# Patient Record
Sex: MALE | Race: WHITE | Employment: UNEMPLOYED | URBAN - METROPOLITAN AREA
[De-identification: names, ages, dates, MRNs, and addresses within clinical notes are randomized per-mention and may not be internally consistent; named-entity substitution may affect disease eponyms.]

---
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
## 2021-01-21 ENCOUNTER — OFFICE VISIT (OUTPATIENT)
Dept: PEDIATRIC ENDOCRINOLOGY | Age: 21
End: 2021-01-21
Payer: COMMERCIAL

## 2021-01-21 VITALS
HEART RATE: 112 BPM | TEMPERATURE: 97 F | DIASTOLIC BLOOD PRESSURE: 79 MMHG | SYSTOLIC BLOOD PRESSURE: 139 MMHG | WEIGHT: 127.6 LBS | BODY MASS INDEX: 18.9 KG/M2 | OXYGEN SATURATION: 98 % | HEIGHT: 69 IN

## 2021-01-21 DIAGNOSIS — E10.9 TYPE 1 DIABETES MELLITUS WITHOUT COMPLICATION (HCC): Primary | ICD-10-CM

## 2021-01-21 LAB — HBA1C MFR BLD HPLC: 9.6 %

## 2021-01-21 PROCEDURE — 99215 OFFICE O/P EST HI 40 MIN: CPT | Performed by: PEDIATRICS

## 2021-01-21 PROCEDURE — 83036 HEMOGLOBIN GLYCOSYLATED A1C: CPT | Performed by: PEDIATRICS

## 2021-01-21 PROCEDURE — 95251 CONT GLUC MNTR ANALYSIS I&R: CPT | Performed by: PEDIATRICS

## 2021-01-21 RX ORDER — GLUCAGON 3 MG/1
POWDER NASAL
Qty: 2 EACH | Refills: 0 | Status: SHIPPED | OUTPATIENT
Start: 2021-01-21 | End: 2022-10-21 | Stop reason: SDUPTHER

## 2021-01-21 NOTE — PROGRESS NOTES
[address] [facility].  [address] [address]  [phone]        Cc: Type 1 diabetes          On insulin pump: Tandem basal IQ          Fluctuation of blood sugars          Low blood sugars: Occasional          Other: Dexcom G6    [facility]:  Jazlyn Murray is a 24 y.o.  male who presents for follow up evaluation of Type 1 diabetes mellitus. His clinical course has been stable. Insulin dosage review with Todd's caregiver suggested compliance all of the time. Associated symptoms of hyperglycemia have included : none . Associated symptoms of hypoglycemia have included: jitteriness and sweating. Treatment of low blood sugar: appropriate. He is currently taking:     Humalog through : insulin pump: Tandem basal IQ was reviewed:   Change of insulin insertion sites: every 3 days. Any problems with insertion sites: none  Compliance with blood gucose monitoring: good . The patient  does perform independently. Exercise: intermittently Meal planning: He is using avoidance of concentrated sweets, carbohydrate counting. Bula Dayhoff He does low-carb diet and usually meals are between 15 to 35 g per meal and snacks are between 10 to 15 g which totals to about an average of 100 g of carbohydrate per day. He does not bolus insulin for all the meals and snacks. blood glucose times and ranges: See scanned log . MedicAlert Identification Noted? no     Past Medical History:   Diagnosis Date    Diabetes ([address]     type 1    Seizures (HCC)        Past Surgical History:   Procedure Laterality Date    HX ORTHOPAEDIC      club foot RIGHT       Family History   Problem Relation Age of Onset    Diabetes Neg Hx     Thyroid Disease Neg Hx        Current Outpatient Medications   Medication Sig Dispense Refill    glucagon (Baqsimi) 3 mg/actuation nasal spray Spray one device in one nostril for severe hypoglycemia or unconsciousness. Please label seperately.  Disp 2 1- home 1 school 2 Each 0    insulin lispro (HUMALOG) 100 unit/mL injection Inject up to 100 units daily via insulin pump. 30 mL 1    glucose blood VI test strips (CONTOUR NEXT TEST STRIPS) strip TEST UP TO 6 TIMES DAILY OR AS DIRECTED BY PHYSICIAN 600 Strip 3    insulin glargine (BASAGLAR KWIKPEN U-100 INSULIN) 100 unit/mL (3 mL) inpn Inject 30 units daily for pump failure 10 mL 3    glucagon (GLUCAGON EMERGENCY KIT, HUMAN,) 1 mg injection Inject 1 ml into thigh muscle for severe hypogylcemia and semi unconsciousness. Disp 2- 1 home, 1 school 2 mL 0    Acetone, Urine, Test (KETOSTIX) strip Check urine ketones if blood sugar >350 or illness. Disp 2- 1 home,  1-school 100 Strip 4    lancets misc Check blood sugar up to 6x daily 200 Each 4     No Known Allergies    Social History     Socioeconomic History    Marital status: SINGLE     Spouse name: Not on file    Number of children: Not on file    Years of education: Not on file    Highest education level: Not on file   Occupational History    Not on file   Social Needs    Financial resource strain: Not on file    Food insecurity     Worry: Not on file     Inability: Not on file    Transportation needs     Medical: Not on file     Non-medical: Not on file   Tobacco Use    Smoking status: Passive Smoke Exposure - Never Smoker    Smokeless tobacco: Never Used   Substance and Sexual Activity    Alcohol use:  Yes    Drug use: No    Sexual activity: Never   Lifestyle    Physical activity     Days per week: Not on file     Minutes per session: Not on file    Stress: Not on file   Relationships    Social connections     Talks on phone: Not on file     Gets together: Not on file     Attends [employer] service: Not on file     Active member of club or organization: Not on file     Attends meetings of clubs or organizations: Not on file     Relationship status: Not on file    Intimate partner violence     Fear of current or ex partner: Not on file     Emotionally abused: Not on file     Physically abused: Not on file     Forced sexual activity: Not on file   Other Topics Concern    Not on file   Social History Narrative    Not on file       Review of Systems  Constitutional: good energy, ENT: normal hearing, no sorethroat   Eye: normal vision, denied double vision, photophobia, blurred vision  Respiratory system: no wheezing, no respiratory discomfort  CVS: no palpitations, no pedal edema, GI: normal bowel movements, no abdominal pain  Allergy: no skin rash or angioedema, Neurological: no headache, no focal weakness, burning sensation of feet: no, Behavioural: behavior: normal, Skin: no rash or itching, injection sites: Had issue with Dexcom  and have reported it to the company. Objective:     Visit Vitals  BP (!) 150/83   Pulse (!) 112   Temp 97 °F (36.1 °C) (Temporal)   Ht 5' 8.9\" (1.75 m)   Wt 127 lb 9.6 oz (57.9 kg)   SpO2 98%   BMI 18.90 kg/m²       Wt Readings from Last 3 Encounters:   01/21/21 127 lb 9.6 oz (57.9 kg) (9 %, Z= -1.34)*   10/19/20 125 lb 6.4 oz (56.9 kg) (7 %, Z= -1.45)*   10/14/20 122 lb 2.2 oz (55.4 kg) (5 %, Z= -1.65)*     * Growth percentiles are based on CDC (Boys, 2-20 Years) data. Ht Readings from Last 3 Encounters:   01/21/21 5' 8.9\" (1.75 m) (40 %, Z= -0.26)*   10/19/20 5' 8.9\" (1.75 m) (40 %, Z= -0.25)*   01/21/20 5' 8.11\" (1.73 m) (31 %, Z= -0.50)*     * Growth percentiles are based on CDC (Boys, 2-20 Years) data. Body mass index is 18.9 kg/m². 4 %ile (Z= -1.75) based on CDC (Boys, 2-20 Years) BMI-for-age based on BMI available as of 1/21/2021.   9 %ile (Z= -1.34) based on CDC (Boys, 2-20 Years) weight-for-age data using vitals from 1/21/2021.   40 %ile (Z= -0.26) based on CDC (Boys, 2-20 Years) Stature-for-age data based on Stature recorded on 1/21/2021.      General:  alert, cooperative, no distress, appears stated age   Oropharynx: normal    Eyes:  {normal    Ears:  {normal   Neck: {supple, no thyromegaly       Lung: No resp distress   Heart:   Pulse equal and normal   Abdomen: {nondistended   Extremities: extremities normal, atraumatic, no cyanosis or edema   Skin: Injection sites: normal   Pulses: 2+ and symmetric   Neuro: normal without focal findings  mental status, speech normal, alert and oriented x iii  MELISSA  reflexes normal and symmetric             Lab Review  Lab Results   Component Value Date/Time    Hemoglobin A1c 10.4 (H) 10/14/2020 03:57 PM    Hemoglobin A1c 9.7 (H) 10/07/2018 07:41 PM    Hemoglobin A1c 8.5 (H) 02/24/2018 12:18 PM    Hemoglobin A1c (POC) 9.6 01/21/2021 11:15 AM    Hemoglobin A1c (POC) 10.2 10/19/2020 01:24 PM    Hemoglobin A1c (POC) 10.3 01/21/2020 11:07 AM      Lab Results   Component Value Date/Time    Hemoglobin A1c 10.4 (H) 10/14/2020 03:57 PM      Lab Results   Component Value Date/Time    Glucose 426 (H) 10/14/2020 03:57 PM        Lab Results   Component Value Date/Time    TSH 3.590 01/10/2019 12:29 PM     Lab Results   Component Value Date/Time    Cholesterol, total 124 01/10/2019 12:29 PM    HDL Cholesterol 56 01/10/2019 12:29 PM    LDL, calculated 60 01/10/2019 12:29 PM    VLDL, calculated 8 01/10/2019 12:29 PM    Triglyceride 42 01/10/2019 12:29 PM   Reviewed more than 72 hours of data of CGMS    Blood sugar trends noted. Fluctuation in blood sugars: yes. Overnight blood sugar: 90 mg/dl to 190 mg/dl. Blood sugar during day time: trends: higher post meals,  Low blood sugars: occasional    Insulin adjustment was made using these data and noted in assessment and plan section. Assessment:   Diabetes Mellitus type I, under poor control. He is not bolusing insulin for all the meals and snacks. Reviewed the use of control IQ tandem and provided information again for him to contact after finishing the course so that he can switch from the present basal IQ program.  Hypoglycemia: occasional  Blood sugar trends: reviewed  Insulin pump Insertion sites: normal  Screening:  A.  Blood pressure: reviewed, he was tense and a repeat blood pressure was 139/79 mm of Hg  B. Lipid profile: Cholesterol: ordered  C. Urine microalbumin: ordered  D. Celiac screen: ordered  E. Eye exam: need to schedule for this year  Plan:   Treatment changes  Basal rates ( time: units/hour): Humalog through insulin pump:   12 midnight: 0.9,  8 am: 1.2, 10 pm  : 0.85. Total basal insulin per day: 25 units/day. Target blood sugar: 110 mg/dl, Carbohydrate correction breakfast: 1: 8, lunch: 1: 8, dinner:1:8.    Insulin sensitivity factor/ glucose correction factor: breakfast: 1: 50 Lunch: 1: 50, dinner: 1:50. Lantus dose for basal in case of pump failure: 25 units. Insulin dose reviewed and confirmed with the caregiver. Time spent counseling patient 25 minutes  2. Education:  interpretation of lab results, blood sugar goals, complications of diabetes mellitus, hypoglycemia prevention and treatment, exercise, insulin adjustments, illness management, SMBG skills, nutrition, site rotation, use of insulin pen, carbohydrate counting, self-injection of insulin, use of sliding scale/correction formula and use of insulin: carb ratio  Importance of parental supervision stressed. Check urine ketones for:   Blood sugar levels above 350 mg/dl   Nausea and vomiting   Other illnesses, including fever, diarrhea, common cold. If ketones are negative, no change in your diabetes plan is needed  If ketones are trace or small:  Drink extra fluid (water or other calorie-free fluids)  Give insulin as you usually would base on your carbohydrate intake and your blood sugar level  Continue to check the urine ketones until they either go away, or until they increase to moderate or large  If ketones are moderate or large:  Call the diabetes team at [phone]- ask to speak to nurse and after hours/weekend/holidays ask for the pediatric endocrinologist on call. Target Hemoglobin A1C= 7.5 % reviewed. Flu vaccine recommended every year.     Patient expressed understanding. 3.  Compliance at present is estimated to be good. Efforts to improve compliance (if necessary) will be directed at increased exercise. Long term complications, Sick day management, treatment of low blood sugars, use of glucagon for hypoglycemic seizures and unconsciousness reviewed. Change pump site every 3 days and rotation of insertion sites reviewed. Hemoglobin A1C reviewed. Correlation between A1C and long term complications like neuropathy, nephropathy and retinopathy reviewed. Acute complications like diabetes ketoacidosis and dehydration and electrolyte abnormalities discussed. Annual screen labs: due on: ordered (TSH, Lipid profile, Urine microalbumin, celiac screen). Annual eye exam: stressed. Need to review the blood sugars periodically if blood sugars are out of range as discussed in the clinic consistently. School forms: none. Prescriptions:yes.  Total time with patient 40 minutes  Follow-up in 3 months

## 2021-01-21 NOTE — PROGRESS NOTES
Chief Complaint   Patient presents with    Diabetes     3 month follow up      Currently on Tandem could not upload CIQ. Is currently using basal IQ. Asked him to call [employer] today to get the CIQ portal Rx and education.      Rotating sites

## 2021-01-31 LAB
ALBUMIN/CREAT UR: 7 MG/G CREAT (ref 0–29)
CHOLEST SERPL-MCNC: 127 MG/DL (ref 100–169)
CREAT UR-MCNC: 222.8 MG/DL
HDLC SERPL-MCNC: 45 MG/DL
INTERPRETATION, 910389: NORMAL
LDLC SERPL CALC-MCNC: 62 MG/DL (ref 0–109)
MICROALBUMIN UR-MCNC: 16.4 UG/ML
TRIGL SERPL-MCNC: 110 MG/DL (ref 0–89)
TSH SERPL DL<=0.005 MIU/L-ACNC: 2.7 UIU/ML (ref 0.45–4.5)
TTG IGA SER-ACNC: <2 U/ML (ref 0–3)
VLDLC SERPL CALC-MCNC: 20 MG/DL (ref 5–40)

## 2021-03-20 DIAGNOSIS — E10.9 TYPE 1 DIABETES MELLITUS WITHOUT COMPLICATION (HCC): ICD-10-CM

## 2021-03-22 RX ORDER — INSULIN LISPRO 100 [IU]/ML
INJECTION, SOLUTION INTRAVENOUS; SUBCUTANEOUS
Qty: 90 ML | Refills: 1 | Status: SHIPPED | OUTPATIENT
Start: 2021-03-22 | End: 2021-09-27

## 2021-04-26 ENCOUNTER — OFFICE VISIT (OUTPATIENT)
Dept: PEDIATRIC ENDOCRINOLOGY | Age: 21
End: 2021-04-26
Payer: COMMERCIAL

## 2021-04-26 VITALS
WEIGHT: 128.4 LBS | TEMPERATURE: 96.8 F | BODY MASS INDEX: 19.02 KG/M2 | DIASTOLIC BLOOD PRESSURE: 87 MMHG | HEIGHT: 69 IN | HEART RATE: 79 BPM | OXYGEN SATURATION: 100 % | RESPIRATION RATE: 19 BRPM | SYSTOLIC BLOOD PRESSURE: 131 MMHG

## 2021-04-26 DIAGNOSIS — E10.65 UNCONTROLLED TYPE 1 DIABETES MELLITUS WITH HYPERGLYCEMIA (HCC): Primary | ICD-10-CM

## 2021-04-26 LAB — HBA1C MFR BLD HPLC: 9.4 %

## 2021-04-26 PROCEDURE — 99215 OFFICE O/P EST HI 40 MIN: CPT | Performed by: PEDIATRICS

## 2021-04-26 PROCEDURE — 83036 HEMOGLOBIN GLYCOSYLATED A1C: CPT | Performed by: PEDIATRICS

## 2021-04-26 PROCEDURE — 95251 CONT GLUC MNTR ANALYSIS I&R: CPT | Performed by: PEDIATRICS

## 2021-04-26 RX ORDER — BLOOD-GLUCOSE METER
EACH MISCELLANEOUS
Qty: 2 EACH | Refills: 0 | Status: SHIPPED | OUTPATIENT
Start: 2021-04-26

## 2021-04-26 RX ORDER — BLOOD SUGAR DIAGNOSTIC
STRIP MISCELLANEOUS
Qty: 200 STRIP | Refills: 4 | Status: SHIPPED | OUTPATIENT
Start: 2021-04-26

## 2021-04-26 RX ORDER — BLOOD-GLUCOSE METER
EACH MISCELLANEOUS
Qty: 2 EACH | Refills: 0 | Status: CANCELLED | OUTPATIENT
Start: 2021-04-26

## 2021-04-26 RX ORDER — BLOOD SUGAR DIAGNOSTIC
STRIP MISCELLANEOUS
Qty: 200 STRIP | Refills: 4 | Status: CANCELLED | OUTPATIENT
Start: 2021-04-26

## 2021-04-26 NOTE — PROGRESS NOTES
118 [facility].  [address] [place], [place] [id number] [phone]        Cc: Type 1 diabetes          On insulin pump: Tandem basal IQ          Fluctuation of blood sugars          Low blood sugars: occasional          Other: CGM S    [facility]:  Ryan Walter is a 22 y.o.  male who presents for follow up evaluation of Type 1 diabetes mellitus. Checking 4-6 blood sugars per day. His clinical course has been stable. Insulin dosage review with Todd's caregiver suggested compliance most of the time. Associated symptoms of hyperglycemia have included : none . Associated symptoms of hypoglycemia have included: jitteriness, sweating and hunger. Treatment of low blood sugar: appropriate. He is currently taking:     Humalog through : insulin pump: Tandem insulin pump was reviewed: Change of insulin insertion sites: every 3 days. Any problems with insertion sites: none. Compliance with blood gucose monitoring: good . The patient  does perform independently. Exercise: intermittently Meal planning: He is using avoidance of concentrated sweets, carbohydrate counting. . Blood glucose times and ranges: See scanned log . MedicAlert Identification Noted? no     Past Medical History:   Diagnosis Date    Diabetes ([address]     type 1    Seizures (HCC)        Past Surgical History:   Procedure Laterality Date    HX ORTHOPAEDIC      club foot RIGHT       Family History   Problem Relation Age of Onset    Diabetes Neg Hx     Thyroid Disease Neg Hx        Current Outpatient Medications   Medication Sig Dispense Refill    insulin lispro (HumaLOG U-100 Insulin) 100 unit/mL injection INJECT UP  UNITS DAILY VIA INSULIN PUMP. 90 mL 1    glucagon (Baqsimi) 3 mg/actuation nasal spray Spray one device in one nostril for severe hypoglycemia or unconsciousness. Please label seperately.  Disp 2 1- home 1 school 2 Each 0    glucose blood VI test strips (CONTOUR NEXT TEST STRIPS) strip TEST UP TO 6 TIMES DAILY OR AS DIRECTED BY PHYSICIAN 600 Strip 3    insulin glargine (BASAGLAR KWIKPEN U-100 INSULIN) 100 unit/mL (3 mL) inpn Inject 30 units daily for pump failure 10 mL 3    glucagon (GLUCAGON EMERGENCY KIT, HUMAN,) 1 mg injection Inject 1 ml into thigh muscle for severe hypogylcemia and semi unconsciousness. Disp 2- 1 home, 1 school 2 mL 0    Acetone, Urine, Test (KETOSTIX) strip Check urine ketones if blood sugar >350 or illness. Disp 2- 1 home,  1-school 100 Strip 4    lancets misc Check blood sugar up to 6x daily 200 Each 4     No Known Allergies    Social History     Socioeconomic History    Marital status: SINGLE     Spouse name: Not on file    Number of children: Not on file    Years of education: Not on file    Highest education level: Not on file   Occupational History    Not on file   Social Needs    Financial resource strain: Not on file    Food insecurity     Worry: Not on file     Inability: Not on file    Transportation needs     Medical: Not on file     Non-medical: Not on file   Tobacco Use    Smoking status: Passive Smoke Exposure - Never Smoker    Smokeless tobacco: Never Used   Substance and Sexual Activity    Alcohol use:  Yes    Drug use: No    Sexual activity: Never   Lifestyle    Physical activity     Days per week: Not on file     Minutes per session: Not on file    Stress: Not on file   Relationships    Social connections     Talks on phone: Not on file     Gets together: Not on file     Attends Confucianism service: Not on file     Active member of club or organization: Not on file     Attends meetings of clubs or organizations: Not on file     Relationship status: Not on file    Intimate partner violence     Fear of current or ex partner: Not on file     Emotionally abused: Not on file     Physically abused: Not on file     Forced sexual activity: Not on file   Other Topics Concern    Not on file   Social History Narrative    Not on file   Review of Systems  Constitutional: good energy, ENT: normal hearing, no sorethroat   Eye: normal vision, denied double vision, photophobia, blurred vision  Respiratory system: no wheezing, no respiratory discomfort  CVS: no palpitations, no pedal edema, GI: normal bowel movements, no abdominal pain  Allergy: no skin rash or angioedema, Neurological: no headache, no focal weakness, burning sensation of feet: no, Behavioural: behavior: normal, mood; normal, Skin: no rash or itching, injection sites: normal.   Objective:     Visit Vitals  /87 (BP 1 Location: Left upper arm, BP Patient Position: Sitting, BP Cuff Size: Adult)   Pulse 79   Temp 96.8 °F (36 °C) (Temporal)   Resp 19   Ht 5' 9.13\" (1.756 m)   Wt 128 lb 6.4 oz (58.2 kg)   SpO2 100%   BMI 18.89 kg/m²       Wt Readings from Last 3 Encounters:   04/26/21 128 lb 6.4 oz (58.2 kg)   01/21/21 127 lb 9.6 oz (57.9 kg) (9 %, Z= -1.34)*   10/19/20 125 lb 6.4 oz (56.9 kg) (7 %, Z= -1.45)*     * Growth percentiles are based on CDC (Boys, 2-20 Years) data. Ht Readings from Last 3 Encounters:   04/26/21 5' 9.13\" (1.756 m)   01/21/21 5' 8.9\" (1.75 m) (40 %, Z= -0.26)*   10/19/20 5' 8.9\" (1.75 m) (40 %, Z= -0.25)*     * Growth percentiles are based on CDC (Boys, 2-20 Years) data. Body mass index is 18.89 kg/m². Facility age limit for growth percentiles is 20 years. Facility age limit for growth percentiles is 20 years. Facility age limit for growth percentiles is 20 years.      General:  alert, cooperative, no distress, appears stated age   Oropharynx: normal    Eyes:  {normal    Ears:  {normal   Neck: {supple, no thyromegaly       Lung: No resp distress   Heart:  Pulse equal and normal   Abdomen: {nondistended   Extremities: extremities normal, atraumatic, no cyanosis or edema   Skin: Injection sites: normal   Pulses: 2+ and symmetric   Neuro: normal without focal findings  mental status, speech normal, alert and oriented x iii  MELISSA  reflexes normal and symmetric             Lab Review  Lab Results   Component Value Date/Time    Hemoglobin A1c 10.4 (H) 10/14/2020 03:57 PM    Hemoglobin A1c 9.7 (H) 10/07/2018 07:41 PM    Hemoglobin A1c 8.5 (H) 02/24/2018 12:18 PM    Hemoglobin A1c (POC) 9.4 04/26/2021 11:20 AM    Hemoglobin A1c (POC) 9.6 01/21/2021 11:15 AM    Hemoglobin A1c (POC) 10.2 10/19/2020 01:24 PM      Lab Results   Component Value Date/Time    Hemoglobin A1c 10.4 (H) 10/14/2020 03:57 PM      Lab Results   Component Value Date/Time    Glucose 426 (H) 10/14/2020 03:57 PM        Lab Results   Component Value Date/Time    TSH 2.700 01/29/2021 08:57 AM     Lab Results   Component Value Date/Time    Cholesterol, total 127 01/29/2021 08:57 AM    HDL Cholesterol 45 01/29/2021 08:57 AM    LDL, calculated 62 01/29/2021 08:57 AM    LDL, calculated 60 01/10/2019 12:29 PM    VLDL, calculated 20 01/29/2021 08:57 AM    VLDL, calculated 8 01/10/2019 12:29 PM    Triglyceride 110 (H) 01/29/2021 08:57 AM   Reviewed more than 72 hours of data of CGMS    Blood sugar trends noted. Fluctuation in blood sugars: yes. Overnight blood sugar: 90 mg/dl to 220 mg/dl. Blood sugar during day time: trends: higher post meals,  Low blood sugars: occasional    Insulin adjustment was made using these data and noted in assessment and plan section. Assessment:   Diabetes Mellitus type I, under poor control. Hypoglycemia: occasional  Blood sugar trends: reveiwed  Insulin pump Insertion sites: normal  None provided information to start the control IQ and gave information again for the process. Plan:   Treatment changes  Basal rates ( time: units/hour): Humalog through insulin pump:   12 midnight: 0.95, 8 am: 1.25, 10 PM: 0.9. Total basal insulin per day: 26.9 units/day. Target blood sugar: 110 mg/dl, Carbohydrate correction breakfast: 1: 8, lunch: 1: 8, dinner:1:8.    Insulin sensitivity factor/ glucose correction factor: breakfast: 1: 50 Lunch: 1: 50, dinner: 1:50.  Lantus dose for basal in case of pump failure: 27 units. Insulin dose reviewed and confirmed with the caregiver. Time spent counseling patient 25 minutes  2. Education:  interpretation of lab results, blood sugar goals, complications of diabetes mellitus, hypoglycemia prevention and treatment, exercise, insulin adjustments, illness management, SMBG skills, nutrition, referred to Diabetes Educator, site rotation, use of insulin pen, carbohydrate counting, self-injection of insulin, use of sliding scale/correction formula and use of insulin: carb ratio  Importance of parental supervision stressed. Check urine ketones for:   Blood sugar levels above 350 mg/dl   Nausea and vomiting   Other illnesses, including fever, diarrhea, common cold. If ketones are negative, no change in your diabetes plan is needed  If ketones are trace or small:  Drink extra fluid (water or other calorie-free fluids)  Give insulin as you usually would base on your carbohydrate intake and your blood sugar level  Continue to check the urine ketones until they either go away, or until they increase to moderate or large  If ketones are moderate or large:  Call the diabetes team at [phone] ask to speak to nurse and after hours/weekend/holidays ask for the pediatric endocrinologist on call. Target Hemoglobin A1C= 7.5 % reviewed. Flu vaccine recommended every year. Patient expressed understanding. 3.  Compliance at present is estimated to be good. Efforts to improve compliance (if necessary) will be directed at increased exercise. Long term complications, Sick day management, treatment of low blood sugars, use of glucagon for hypoglycemic seizures and unconsciousness reviewed. Change pump site every 3 days and rotation of insertion sites reviewed. Hemoglobin A1C reviewed. Correlation between A1C and long term complications like neuropathy, nephropathy and retinopathy reviewed.  Acute complications like diabetes ketoacidosis and dehydration and electrolyte abnormalities discussed. Annual screen labs: due on: reviewed (TSH, Lipid profile, Urine microalbumin, celiac screen). Annual eye exam: stressed. Need to review the blood sugars periodically if blood sugars are out of range as discussed in the clinic consistently. School forms: none. Prescriptions:none. Total time with patient 40 minutes  Follow up in 3 months.

## 2021-09-24 ENCOUNTER — OFFICE VISIT (OUTPATIENT)
Dept: PEDIATRIC ENDOCRINOLOGY | Age: 21
End: 2021-09-24
Payer: COMMERCIAL

## 2021-09-24 VITALS
HEART RATE: 107 BPM | TEMPERATURE: 99.8 F | DIASTOLIC BLOOD PRESSURE: 71 MMHG | BODY MASS INDEX: 18.81 KG/M2 | OXYGEN SATURATION: 100 % | HEIGHT: 68 IN | RESPIRATION RATE: 19 BRPM | WEIGHT: 124.13 LBS | SYSTOLIC BLOOD PRESSURE: 126 MMHG

## 2021-09-24 DIAGNOSIS — E10.9 TYPE 1 DIABETES MELLITUS WITHOUT COMPLICATION (HCC): Primary | ICD-10-CM

## 2021-09-24 LAB — HBA1C MFR BLD HPLC: 8.8 %

## 2021-09-24 PROCEDURE — 83036 HEMOGLOBIN GLYCOSYLATED A1C: CPT | Performed by: PEDIATRICS

## 2021-09-24 PROCEDURE — 3052F HG A1C>EQUAL 8.0%<EQUAL 9.0%: CPT | Performed by: PEDIATRICS

## 2021-09-24 PROCEDURE — 99215 OFFICE O/P EST HI 40 MIN: CPT | Performed by: PEDIATRICS

## 2021-09-24 PROCEDURE — 95251 CONT GLUC MNTR ANALYSIS I&R: CPT | Performed by: PEDIATRICS

## 2021-09-24 RX ORDER — BLOOD SUGAR DIAGNOSTIC
STRIP MISCELLANEOUS
Qty: 600 STRIP | Refills: 3 | Status: SHIPPED | OUTPATIENT
Start: 2021-09-24

## 2021-09-24 NOTE — PROGRESS NOTES
[facility].  [address] [address]  [phone]      Cc: Type 1 diabetes          On insulin pump: Tandem basal IQ          Fluctuation of blood sugars          Low blood sugars: occasional          Other: CGM S     [facility]:  Leslie Smith is a 22 years and 10 months old male who presents for follow up evaluation of Type 1 diabetes mellitus. Checking 4-6 blood sugars per day. His clinical course has been stable. Insulin dosage review with Todd's caregiver suggested compliance most of the time. Associated symptoms of hyperglycemia have included : none . Associated symptoms of hypoglycemia have included: jitteriness, sweating and hunger. Treatment of low blood sugar: appropriate.     He is currently taking: Humalog through : insulin pump: Tandem insulin pump was reviewed:  Patient had not changed to control IQ in spite of giving all the information over the last 2 visits . change of insulin insertion sites: every 3 days. Any problems with insertion sites: none. Compliance with blood gucose monitoring: good . The patient  does perform independently. Exercise: intermittently Meal planning: He is using avoidance of concentrated sweets, carbohydrate counting. . Blood glucose times and ranges: See scanned log . MedicAlert Identification Noted? no     Past Medical History:   Diagnosis Date    Diabetes ([address]     type 1    Seizures (HCC)        Past Surgical History:   Procedure Laterality Date    HX ORTHOPAEDIC      club foot RIGHT       Family History   Problem Relation Age of Onset    Diabetes Neg Hx     Thyroid Disease Neg Hx        Current Outpatient Medications   Medication Sig Dispense Refill    Blood-Glucose Meter (Contour Next One Meter) misc Use to test blood sugar.  Disp 2 1 home 1 school 2 Each 0    glucose blood VI test strips (Contour Next Test Strips) strip Test blood sugar up to 6 times daily 200 Strip 4    insulin lispro (HumaLOG U-100 Insulin) 100 unit/mL injection INJECT UP  UNITS DAILY VIA INSULIN PUMP. 90 mL 1    glucagon (Baqsimi) 3 mg/actuation nasal spray Spray one device in one nostril for severe hypoglycemia or unconsciousness. Please label seperately. Disp 2 1- home 1 school 2 Each 0    glucose blood VI test strips (CONTOUR NEXT TEST STRIPS) strip TEST UP TO 6 TIMES DAILY OR AS DIRECTED BY PHYSICIAN 600 Strip 3    insulin glargine (BASAGLAR KWIKPEN U-100 INSULIN) 100 unit/mL (3 mL) inpn Inject 30 units daily for pump failure 10 mL 3    Acetone, Urine, Test (KETOSTIX) strip Check urine ketones if blood sugar >350 or illness. Disp 2- 1 home,  1-school 100 Strip 4    lancets misc Check blood sugar up to 6x daily 200 Each 4    glucagon (GLUCAGON EMERGENCY KIT, HUMAN,) 1 mg injection Inject 1 ml into thigh muscle for severe hypogylcemia and semi unconsciousness. Disp 2- 1 home, 1 school (Patient not taking: Reported on 9/24/2021) 2 mL 0     No Known Allergies    Social History     Socioeconomic History    Marital status: SINGLE     Spouse name: Not on file    Number of children: Not on file    Years of education: Not on file    Highest education level: Not on file   Occupational History    Not on file   Tobacco Use    Smoking status: Passive Smoke Exposure - Never Smoker    Smokeless tobacco: Never Used   Substance and Sexual Activity    Alcohol use: Yes    Drug use: No    Sexual activity: Never   Other Topics Concern    Not on file   Social History Narrative    Not on file     Social Determinants of Health     Financial Resource Strain:     Difficulty of Paying Living Expenses:    Food Insecurity:     Worried About Running Out of Food in the Last Year:     [address] in the Last Year:    Transportation Needs:     Lack of Transportation (Medical):      Lack of Transportation (Non-Medical):    Physical Activity:     Days of Exercise per Week:     Minutes of Exercise per Session:    Stress:     Feeling of Stress : Social Connections:     Frequency of Communication with Friends and Family:     Frequency of Social Gatherings with Friends and Family:     Attends Mandaeism Services:     Active Member of Clubs or Organizations:     Attends Club or Organization Meetings:     Marital Status:    Intimate Partner Violence:     Fear of Current or Ex-Partner:     Emotionally Abused:     Physically Abused:     Sexually Abused:        Review of Systems  Constitutional: good energy, ENT: normal hearing, no sorethroat   Eye: normal vision, denied double vision, photophobia, blurred vision  Respiratory system: no wheezing, no respiratory discomfort  CVS: no palpitations, no pedal edema, GI: normal bowel movements, no abdominal pain  Allergy: no skin rash or angioedema, Neurological: no headache, no focal weakness, burning sensation of feet: no, Behavioural: behavior: normal, mood; normal, Skin: no rash or itching, injection sites: normal.   Objective:     Visit Vitals  /71   Pulse (!) 107   Temp 99.8 °F (37.7 °C) (Temporal)   Resp 19   Ht 5' 8.03\" (1.728 m)   Wt 124 lb 2 oz (56.3 kg)   SpO2 100%   BMI 18.86 kg/m²     Took decongestant this AM and heart rate is increased    Wt Readings from Last 3 Encounters:   09/24/21 124 lb 2 oz (56.3 kg)   04/26/21 128 lb 6.4 oz (58.2 kg)   01/21/21 127 lb 9.6 oz (57.9 kg) (9 %, Z= -1.34)*     * Growth percentiles are based on CDC (Boys, 2-20 Years) data. Ht Readings from Last 3 Encounters:   09/24/21 5' 8.03\" (1.728 m)   04/26/21 5' 9.13\" (1.756 m)   01/21/21 5' 8.9\" (1.75 m) (40 %, Z= -0.26)*     * Growth percentiles are based on CDC (Boys, 2-20 Years) data. Body mass index is 18.86 kg/m². Facility age limit for growth percentiles is 20 years. Facility age limit for growth percentiles is 20 years. Facility age limit for growth percentiles is 20 years.      General:  alert, cooperative, no distress, appears stated age   Oropharynx: normal    Eyes:  {normal    Ears: {normal   Neck: {supple, no thyromegaly       Lung: No resp distress   Heart:  Pulse equal and normal   Abdomen: {nondistended   Extremities: extremities normal, atraumatic, no cyanosis or edema   Skin: Injection sites: normal   Pulses: 2+ and symmetric   Neuro: normal without focal findings  mental status, speech normal, alert and oriented x iii  MELISSA  reflexes normal and symmetric             Lab Review  Lab Results   Component Value Date/Time    Hemoglobin A1c 10.4 (H) 10/14/2020 03:57 PM    Hemoglobin A1c 9.7 (H) 10/07/2018 07:41 PM    Hemoglobin A1c 8.5 (H) 02/24/2018 12:18 PM    Hemoglobin A1c (POC) 9.4 04/26/2021 11:20 AM    Hemoglobin A1c (POC) 9.6 01/21/2021 11:15 AM    Hemoglobin A1c (POC) 10.2 10/19/2020 01:24 PM      Lab Results   Component Value Date/Time    Hemoglobin A1c 10.4 (H) 10/14/2020 03:57 PM      Lab Results   Component Value Date/Time    Glucose 426 (H) 10/14/2020 03:57 PM        Lab Results   Component Value Date/Time    TSH 2.700 01/29/2021 08:57 AM     Lab Results   Component Value Date/Time    Cholesterol, total 127 01/29/2021 08:57 AM    HDL Cholesterol 45 01/29/2021 08:57 AM    LDL, calculated 62 01/29/2021 08:57 AM    LDL, calculated 60 01/10/2019 12:29 PM    VLDL, calculated 20 01/29/2021 08:57 AM    VLDL, calculated 8 01/10/2019 12:29 PM    Triglyceride 110 (H) 01/29/2021 08:57 AM   Reviewed more than 72 hours of data of CGMS    Blood sugar trends noted. Fluctuation in blood sugars: yes. Overnight blood sugar: 90 mg/dl to 220 mg/dl. Blood sugar during day time: trends: higher post meals,  Low blood sugars: occasional  Does very minimal boluses afraid of low blood sugars and tend to eat low carbs. He tends to run high blood sugars during the daytime because of the concern of hypoglycemia  Insulin adjustment was made using these data and noted in assessment and plan section. Assessment:   Diabetes Mellitus type I, under poor control.   Hypoglycemia: occasional  Blood sugar trends: reviewed  Insulin pump Insertion sites: normal    Plan:   Treatment changes  Basal rates ( time: units/hour): Humalog through insulin pump:   12 midnight: 0.95,  1.35 am: 10 pm, 0.9 Total basal insulin per day: 28 units/day. Target blood sugar: 110 mg/dl, Carbohydrate correction breakfast: 1: 8, lunch: 1: 8, dinner:1:8.    Insulin sensitivity factor/ glucose correction factor: breakfast: 1: 45 Lunch: 1: 45, dinner: 1:45. Lantus dose for basal in case of pump failure: 28 units. Insulin dose reviewed and confirmed with the caregiver. Time spent counseling patient 25 minutes  2. Education:  interpretation of lab results, blood sugar goals, complications of diabetes mellitus, hypoglycemia prevention and treatment, exercise, insulin adjustments, illness management, SMBG skills, nutrition, site rotation, use of insulin pen, carbohydrate counting and self-injection of insulin  Importance of parental supervision stressed. Check urine ketones for:   Blood sugar levels above 350 mg/dl   Nausea and vomiting   Other illnesses, including fever, diarrhea, common cold. If ketones are negative, no change in your diabetes plan is needed  If ketones are trace or small:  Drink extra fluid (water or other calorie-free fluids)  Give insulin as you usually would base on your carbohydrate intake and your blood sugar level  Continue to check the urine ketones until they either go away, or until they increase to moderate or large  If ketones are moderate or large:  Call the diabetes team at [phone] ask to speak to nurse and after hours/weekend/holidays ask for the pediatric endocrinologist on call. Target Hemoglobin A1C= 7.5 % reviewed. Flu vaccine recommended every year. Patient expressed understanding. 3.  Compliance at present is estimated to be good. Efforts to improve compliance (if necessary) will be directed at increased exercise.   Long term complications, Sick day management, treatment of low blood sugars, use of glucagon for hypoglycemic seizures and unconsciousness reviewed. Change pump site every 3 days and rotation of insertion sites reviewed. Hemoglobin A1C reviewed. Correlation between A1C and long term complications like neuropathy, nephropathy and retinopathy reviewed. Acute complications like diabetes ketoacidosis and dehydration and electrolyte abnormalities discussed. Annual screen labs: due on: none (TSH, Lipid profile, Urine microalbumin, celiac screen). Annual eye exam: stressed. Need to review the blood sugars periodically if blood sugars are out of range as discussed in the clinic consistently. School forms: none. Prescriptions:yes. Total time with patient 40 minutes  Encourage patient to apply for control IQ and provided information and steps involved again. Reviewed the treatment of low blood sugar and the concerns he has with hypoglycemia and control IQ should be helping him too.

## 2021-09-27 DIAGNOSIS — E10.9 TYPE 1 DIABETES MELLITUS WITHOUT COMPLICATION (HCC): ICD-10-CM

## 2021-09-27 RX ORDER — INSULIN LISPRO 100 [IU]/ML
INJECTION, SOLUTION INTRAVENOUS; SUBCUTANEOUS
Qty: 90 ML | Refills: 1 | Status: SHIPPED | OUTPATIENT
Start: 2021-09-27 | End: 2022-05-26

## 2022-01-10 ENCOUNTER — PATIENT MESSAGE (OUTPATIENT)
Dept: PEDIATRIC ENDOCRINOLOGY | Age: 22
End: 2022-01-10

## 2022-01-10 ENCOUNTER — OFFICE VISIT (OUTPATIENT)
Dept: PEDIATRIC ENDOCRINOLOGY | Age: 22
End: 2022-01-10
Payer: COMMERCIAL

## 2022-01-10 VITALS
WEIGHT: 126 LBS | OXYGEN SATURATION: 99 % | SYSTOLIC BLOOD PRESSURE: 110 MMHG | HEART RATE: 88 BPM | TEMPERATURE: 97.8 F | BODY MASS INDEX: 19.1 KG/M2 | DIASTOLIC BLOOD PRESSURE: 73 MMHG | HEIGHT: 68 IN

## 2022-01-10 DIAGNOSIS — E10.9 TYPE 1 DIABETES MELLITUS WITHOUT COMPLICATION (HCC): Primary | ICD-10-CM

## 2022-01-10 LAB — HBA1C MFR BLD HPLC: 8.9 %

## 2022-01-10 PROCEDURE — 83036 HEMOGLOBIN GLYCOSYLATED A1C: CPT | Performed by: PEDIATRICS

## 2022-01-10 PROCEDURE — 3052F HG A1C>EQUAL 8.0%<EQUAL 9.0%: CPT | Performed by: PEDIATRICS

## 2022-01-10 PROCEDURE — 99215 OFFICE O/P EST HI 40 MIN: CPT | Performed by: PEDIATRICS

## 2022-01-10 NOTE — PROGRESS NOTES
Elly Head is a 22 y.o. male    Chief Complaint   Patient presents with    Follow-up     diabetes; Concerned with blister on 4th toe of left foot; has healed;        1. Have you been to the ER, urgent care clinic since your last visit? Hospitalized since your last visit? No    2. Have you seen or consulted any other health care providers outside of the [address] [place] [address] since your last visit? Include any pap smears or colon screening.  No

## 2022-01-10 NOTE — PROGRESS NOTES
[facility] Encounter:    Met with aR Schmitz, he is doing well. See below email to [employer]. He reports there is an error 11 code when trying to upload the new [facility]! Can you help me to see where we are with getting the below patient set up with control IQ. He has fear of lows and is resistance to change any dosing. He is currently using BIQ but I need to support him. Can you please let me know if we need to get Dr Dixie Real to sign the Rx for the CIQ.  We have working for a year to get him this amazing technology- I would like to see this through    Hodges Hair  Male, 22 y.o., 2000

## 2022-01-10 NOTE — PROGRESS NOTES
118 [facility].  [address] [address] [phone]        Cc: Type 1 diabetes          On insulin pump: Tandem basal IQ          Fluctuation of blood sugars          Low blood sugars: occasional          Other: CGM S          Fear of low blood sugars. [facility]:  Todd Finnegan is a 21 years and 11 months old male who presents for follow up evaluation of Type 1 diabetes mellitus. Checking 4-6 blood sugars per day. His clinical course has been stable. Insulin dosage review with Todd's caregiver suggested compliance most of the time.  Associated symptoms of hyperglycemia have included : none .  Associated symptoms of hypoglycemia have included: jitteriness, sweating and hunger. Treatment of low blood sugar: appropriate.     He is currently taking: Humalog through : insulin pump: Tandem insulin pump was reviewed:  Patient had not changed to control IQ in spite of giving all the information over the last 3 visits . change of insulin insertion sites: every 3 days. Any problems with insertion sites: none. Compliance with blood gucose monitoring: good. The patient  does perform independently. Exercise: intermittently Meal planning: He is using avoidance of concentrated sweets, carbohydrate counting. . Blood glucose times and ranges: See scanned log . MedicAlert Identification Noted? no. Past Medical History:   Diagnosis Date    Diabetes ([address]     type 1    Seizures (HCC)        Past Surgical History:   Procedure Laterality Date    HX ORTHOPAEDIC      club foot RIGHT       Family History   Problem Relation Age of Onset    Diabetes Neg Hx     Thyroid Disease Neg Hx        Current Outpatient Medications   Medication Sig Dispense Refill    insulin lispro (HumaLOG U-100 Insulin) 100 unit/mL injection INJECT UP  UNITS DAILY VIA INSULIN PUMP.  90 mL 1    glucose blood VI test strips (Contour Next Test Strips) strip TEST UP TO 6 TIMES DAILY OR AS DIRECTED BY PHYSICIAN 600 Strip 3    Blood-Glucose Meter (Contour Next One Meter) misc Use to test blood sugar. Disp 2 1 home 1 school 2 Each 0    glucose blood VI test strips (Contour Next Test Strips) strip Test blood sugar up to 6 times daily 200 Strip 4    glucagon (Baqsimi) 3 mg/actuation nasal spray Spray one device in one nostril for severe hypoglycemia or unconsciousness. Please label seperately. Disp 2 1- home 1 school 2 Each 0    insulin glargine (BASAGLAR KWIKPEN U-100 INSULIN) 100 unit/mL (3 mL) inpn Inject 30 units daily for pump failure 10 mL 3    Acetone, Urine, Test (KETOSTIX) strip Check urine ketones if blood sugar >350 or illness. Disp 2- 1 home,  1-school 100 Strip 4    lancets misc Check blood sugar up to 6x daily 200 Each 4    glucagon (GLUCAGON EMERGENCY KIT, HUMAN,) 1 mg injection Inject 1 ml into thigh muscle for severe hypogylcemia and semi unconsciousness. Disp 2- 1 home, 1 school (Patient not taking: Reported on 9/24/2021) 2 mL 0     No Known Allergies    Social History     Socioeconomic History    Marital status: SINGLE     Spouse name: Not on file    Number of children: Not on file    Years of education: Not on file    Highest education level: Not on file   Occupational History    Not on file   Tobacco Use    Smoking status: Passive Smoke Exposure - Never Smoker    Smokeless tobacco: Never Used   Substance and Sexual Activity    Alcohol use: Yes    Drug use: No    Sexual activity: Never   Other Topics Concern    Not on file   Social History Narrative    Not on file     Social Determinants of Health     Financial Resource Strain:     Difficulty of Paying Living Expenses: Not on file   Food Insecurity:     Worried About Running Out of Food in the Last Year: Not on file    Jorge of Food in the Last Year: Not on file   Transportation Needs:     Lack of Transportation (Medical): Not on file    Lack of Transportation (Non-Medical):  Not on file   Physical Activity:     Days of Exercise per Week: Not on file    Minutes of Exercise per Session: Not on file   Stress:     Feeling of Stress : Not on file   Social Connections:     Frequency of Communication with Friends and Family: Not on file    Frequency of Social Gatherings with Friends and Family: Not on file    Attends Islam Services: Not on file    Active Member of [address] [place] [address] or Organizations: Not on file    Attends Club or Organization Meetings: Not on file    Marital Status: Not on file   Intimate Partner Violence:     Fear of Current or Ex-Partner: Not on file    Emotionally Abused: Not on file    Physically Abused: Not on file    Sexually Abused: Not on file   Housing Stability:     Unable to Pay for Housing in the Last Year: Not on file    Number of [place] in the Last Year: Not on file    Unstable Housing in the Last Year: Not on file   Review of Systems  Constitutional: good energy, ENT: normal hearing, no sorethroat   Eye: normal vision, denied double vision, photophobia, blurred vision  Respiratory system: no wheezing, no respiratory discomfort  CVS: no palpitations, no pedal edema, GI: normal bowel movements, no abdominal pain  Allergy: no skin rash or angioedema, Neurological: no headache, no focal weakness, burning sensation of feet: no, Behavioural: behavior: normal, mood; normal, Skin: no rash or itching, injection sites: no.   Objective:     Visit Vitals  /73 (BP 1 Location: Left upper arm, BP Patient Position: Sitting)   Pulse 88   Temp 97.8 °F (36.6 °C) (Temporal)   Ht 5' 7.91\" (1.725 m)   Wt 126 lb (57.2 kg)   SpO2 99%   BMI 19.21 kg/m²       Wt Readings from Last 3 Encounters:   01/10/22 126 lb (57.2 kg)   09/24/21 124 lb 2 oz (56.3 kg)   04/26/21 128 lb 6.4 oz (58.2 kg)       Ht Readings from Last 3 Encounters:   01/10/22 5' 7.91\" (1.725 m)   09/24/21 5' 8.03\" (1.728 m)   04/26/21 5' 9.13\" (1.756 m)      Body mass index is 19.21 kg/m². Facility age limit for growth percentiles is 20 years.    Facility age limit for growth percentiles is 20 years. Facility age limit for growth percentiles is 20 years. General:  alert, cooperative, no distress, appears stated age   Oropharynx: normal    Eyes:  {normal    Ears:  {normal   Neck: {supple, no thyromegaly       Lung: No resp distress   Heart:  Pulse equal and normal   Abdomen: {nondistended   Extremities: extremities normal, atraumatic, no cyanosis or edema   Skin: Injection sites: normal   Pulses: 2+ and symmetric   Neuro: normal without focal findings  mental status, speech normal, alert and oriented x iii  MELISSA  reflexes normal and symmetric             Lab Review  Lab Results   Component Value Date/Time    Hemoglobin A1c 10.4 (H) 10/14/2020 03:57 PM    Hemoglobin A1c 9.7 (H) 10/07/2018 07:41 PM    Hemoglobin A1c 8.5 (H) 02/24/2018 12:18 PM    Hemoglobin A1c (POC) 8.8 09/24/2021 11:32 AM    Hemoglobin A1c (POC) 9.4 04/26/2021 11:20 AM    Hemoglobin A1c (POC) 9.6 01/21/2021 11:15 AM      Lab Results   Component Value Date/Time    Hemoglobin A1c 10.4 (H) 10/14/2020 03:57 PM      Lab Results   Component Value Date/Time    Glucose 426 (H) 10/14/2020 03:57 PM        Lab Results   Component Value Date/Time    TSH 2.700 01/29/2021 08:57 AM     Lab Results   Component Value Date/Time    Cholesterol, total 127 01/29/2021 08:57 AM    HDL Cholesterol 45 01/29/2021 08:57 AM    LDL, calculated 62 01/29/2021 08:57 AM    LDL, calculated 60 01/10/2019 12:29 PM    VLDL, calculated 20 01/29/2021 08:57 AM    VLDL, calculated 8 01/10/2019 12:29 PM    Triglyceride 110 (H) 01/29/2021 08:57 AM     Reviewed more than 72 hours of data of CGMS     Blood sugar trends noted. Fluctuation in blood sugars: yes. Overnight blood sugar: 90 mg/dl to 220 mg/dl. Blood sugar during day time: trends: higher post meals,  Low blood sugars: occasional  Does very minimal boluses afraid of low blood sugars and tend to eat low carbs.   He tends to run high blood sugars during the daytime because of the concern of hypoglycemia  Insulin adjustment was made using these data and noted in assessment and plan section.      Assessment:   Diabetes Mellitus type I, under poor control. Hypoglycemia: occasional  Blood sugar trends: reviewed  Insulin pump Insertion sites: normal   Plan:   Treatment changes  Basal rates ( time: units/hour): Humalog through insulin pump:   12 midnight: 0.95,  1.35 am: 10 pm, 0.9 Total basal insulin per day: 28 units/day.       Target blood sugar: 110 mg/dl, Carbohydrate correction breakfast: 1: 8, lunch: 1: 8, dinner:1:8.     Insulin sensitivity factor/ glucose correction factor: breakfast: 1: 45 Lunch: 1: 45, dinner: 1:45. Lantus dose for basal in case of pump failure: 28 units. Talked to our CDE to help to transition to insulin pump Control IQ  Insulin dose reviewed and confirmed with the caregiver. Time spent counseling patient 25 minutes  2. Education:  interpretation of lab results, blood sugar goals, complications of diabetes mellitus, hypoglycemia prevention and treatment, exercise, insulin adjustments, illness management, SMBG skills, nutrition, site rotation, use of insulin pen, carbohydrate counting and self-injection of insulin  Importance of parental supervision stressed. Check urine ketones for:  · Blood sugar levels above 350 mg/dl  · Nausea and vomiting  · Other illnesses, including fever, diarrhea, common cold.   If ketones are negative, no change in your diabetes plan is needed  If ketones are trace or small:  Drink extra fluid (water or other calorie-free fluids)  Give insulin as you usually would base on your carbohydrate intake and your blood sugar level  Continue to check the urine ketones until they either go away, or until they increase to moderate or large  If ketones are moderate or large:  Call the diabetes team at [phone] ask to speak to nurse and after hours/weekend/holidays ask for the pediatric endocrinologist on call.      Target Hemoglobin A1C= 7.5 % reviewed. Flu vaccine recommended every year.     Patient expressed understanding.     3. Compliance at present is estimated to be good. Efforts to improve compliance (if necessary) will be directed at increased exercise. Long term complications, Sick day management, treatment of low blood sugars, use of glucagon for hypoglycemic seizures and unconsciousness reviewed. Change pump site every 3 days and rotation of insertion sites reviewed. Hemoglobin A1C reviewed. Correlation between A1C and long term complications like neuropathy, nephropathy and retinopathy reviewed. Acute complications like diabetes ketoacidosis and dehydration and electrolyte abnormalities discussed. Annual screen labs: due on: none (TSH, Lipid profile, Urine microalbumin, celiac screen). Annual eye exam: stressed. Need to review the blood sugars periodically if blood sugars are out of range as discussed in the clinic consistently. School forms: none. Prescriptions:yes. Total time with patient 40 minutes. Follow up in 3 months. Encourage patient to apply for control IQ and provided information and steps involved again. Reviewed the treatment of low blood sugar and the concerns he has with hypoglycemia and control IQ should be helping him too.

## 2022-05-20 ENCOUNTER — TELEPHONE (OUTPATIENT)
Dept: PEDIATRIC GASTROENTEROLOGY | Age: 22
End: 2022-05-20

## 2022-05-20 NOTE — TELEPHONE ENCOUNTER
Lam Marino from [facility] called checking status for CGM order.     Please advise [phone]

## 2022-05-26 DIAGNOSIS — E10.9 TYPE 1 DIABETES MELLITUS WITHOUT COMPLICATION (HCC): ICD-10-CM

## 2022-05-26 RX ORDER — INSULIN LISPRO 100 [IU]/ML
INJECTION, SOLUTION INTRAVENOUS; SUBCUTANEOUS
Qty: 90 ML | Refills: 0 | Status: SHIPPED | OUTPATIENT
Start: 2022-05-26

## 2022-06-07 ENCOUNTER — OFFICE VISIT (OUTPATIENT)
Dept: PEDIATRIC ENDOCRINOLOGY | Age: 22
End: 2022-06-07
Payer: COMMERCIAL

## 2022-06-07 VITALS
RESPIRATION RATE: 17 BRPM | WEIGHT: 131.4 LBS | DIASTOLIC BLOOD PRESSURE: 79 MMHG | SYSTOLIC BLOOD PRESSURE: 127 MMHG | BODY MASS INDEX: 19.46 KG/M2 | HEIGHT: 69 IN | TEMPERATURE: 98.4 F | OXYGEN SATURATION: 100 % | HEART RATE: 71 BPM

## 2022-06-07 DIAGNOSIS — E10.9 TYPE 1 DIABETES MELLITUS WITHOUT COMPLICATION (HCC): Primary | ICD-10-CM

## 2022-06-07 LAB — HBA1C MFR BLD HPLC: 7.8 %

## 2022-06-07 PROCEDURE — 95251 CONT GLUC MNTR ANALYSIS I&R: CPT | Performed by: PEDIATRICS

## 2022-06-07 PROCEDURE — 83036 HEMOGLOBIN GLYCOSYLATED A1C: CPT | Performed by: PEDIATRICS

## 2022-06-07 PROCEDURE — 99215 OFFICE O/P EST HI 40 MIN: CPT | Performed by: PEDIATRICS

## 2022-06-07 PROCEDURE — 3051F HG A1C>EQUAL 7.0%<8.0%: CPT | Performed by: PEDIATRICS

## 2022-06-07 NOTE — PROGRESS NOTES
118 [facility], [address]  [address] [address]  [phone]        Cc: Type 1 diabetes          On insulin pump: Tandem control IQ          Fluctuation of blood sugars          Low blood sugars: occasional          Other: CGM S          Fear of low blood sugars. [facility]:  Todd Finnegan is a 25years old male who presents for follow up evaluation of Type 1 diabetes mellitus. Wearing CGMS and liking it and he has fear of low blood sugars and is getting better and now he allows blood sugars to be in the 90's before treatment. Checking 4-6 blood sugars per day. His clinical course has been stable. Insulin dosage review with Todd's caregiver suggested compliance most of the time.  Associated symptoms of hyperglycemia have included : none .  Associated symptoms of hypoglycemia have included: jitteriness, sweating and hunger. Treatment of low blood sugar: appropriate.     He is currently taking: Humalog through : insulin pump: Tandem insulin pump was reviewed:  .change of insulin insertion sites: every 3 days. Any problems with insertion sites: none. Compliance with blood gucose monitoring: good. He does 3 meals ad 1-2 snacks, he has low carb meals 25-30 grams per meal, carb for snacks are arund 15 grams. He is working full time at [employer] between 6 am to 3 pm. The patient  does perform independently. Exercise: intermittently Meal planning: He is using avoidance of concentrated sweets, carbohydrate counting. . Blood glucose times and ranges: See scanned log . MedicAlert Identification Noted? no.   Past Medical History:   Diagnosis Date    Diabetes ([address]     type 1    Seizures (HCC)        Past Surgical History:   Procedure Laterality Date    HX ORTHOPAEDIC      club foot RIGHT       Family History   Problem Relation Age of Onset    Diabetes Neg Hx     Thyroid Disease Neg Hx        Current Outpatient Medications   Medication Sig Dispense Refill    insulin pump (T:Slim X2 Control-IQ) misc by SubCUTAneous route continuous.  insulin lispro (HumaLOG U-100 Insulin) 100 unit/mL injection INJECT UP  UNITS DAILY VIA INSULIN PUMP. 90 mL 0    glucose blood VI test strips (Contour Next Test Strips) strip TEST UP TO 6 TIMES DAILY OR AS DIRECTED BY PHYSICIAN 600 Strip 3    Blood-Glucose Meter (Contour Next One Meter) misc Use to test blood sugar. Disp 2 1 home 1 school 2 Each 0    glucose blood VI test strips (Contour Next Test Strips) strip Test blood sugar up to 6 times daily 200 Strip 4    glucagon (Baqsimi) 3 mg/actuation nasal spray Spray one device in one nostril for severe hypoglycemia or unconsciousness. Please label seperately. Disp 2 1- home 1 school 2 Each 0    insulin glargine (BASAGLAR KWIKPEN U-100 INSULIN) 100 unit/mL (3 mL) inpn Inject 30 units daily for pump failure 10 mL 3    Acetone, Urine, Test (KETOSTIX) strip Check urine ketones if blood sugar >350 or illness. Disp 2- 1 home,  1-school 100 Strip 4    lancets misc Check blood sugar up to 6x daily 200 Each 4    glucagon (GLUCAGON EMERGENCY KIT, HUMAN,) 1 mg injection Inject 1 ml into thigh muscle for severe hypogylcemia and semi unconsciousness. Disp 2- 1 home, 1 school (Patient not taking: Reported on 9/24/2021) 2 mL 0     No Known Allergies    Social History     Socioeconomic History    Marital status: SINGLE     Spouse name: Not on file    Number of children: Not on file    Years of education: Not on file    Highest education level: Not on file   Occupational History    Not on file   Tobacco Use    Smoking status: Passive Smoke Exposure - Never Smoker    Smokeless tobacco: Never Used   Substance and Sexual Activity    Alcohol use:  Yes    Drug use: No    Sexual activity: Never   Other Topics Concern    Not on file   Social History Narrative    Not on file     Social Determinants of Health     Financial Resource Strain:     Difficulty of Paying Living Expenses: Not on file   Food Insecurity:     Worried About Running Out of Food in the Last Year: Not on file    Ran Out of Food in the Last Year: Not on file   Transportation Needs:     Lack of Transportation (Medical): Not on file    Lack of Transportation (Non-Medical):  Not on file   Physical Activity:     Days of Exercise per Week: Not on file    Minutes of Exercise per Session: Not on file   Stress:     Feeling of Stress : Not on file   Social Connections:     Frequency of Communication with Friends and Family: Not on file    Frequency of Social Gatherings with Friends and Family: Not on file    Attends Faith Services: Not on file    Active Member of [address] [place] [address] or Organizations: Not on file    Attends Club or Organization Meetings: Not on file    Marital Status: Not on file   Intimate Partner Violence:     Fear of Current or Ex-Partner: Not on file    Emotionally Abused: Not on file    Physically Abused: Not on file    Sexually Abused: Not on file   Housing Stability:     Unable to Pay for Housing in the Last Year: Not on file    Number of [place] in the Last Year: Not on file    Unstable Housing in the Last Year: Not on file   Review of Systems  Constitutional: good energy, ENT: normal hearing, no sorethroat   Eye: normal vision, denied double vision, photophobia, blurred vision  Respiratory system: no wheezing, no respiratory discomfort  CVS: no palpitations, no pedal edema, GI: normal bowel movements, no abdominal pain  Allergy: no skin rash or angioedema, Neurological: no headache, no focal weakness, burning sensation of feet: no, Behavioural: behavior: normal, mood; normal, Skin: no rash or itching, injection sites: no.   Objective:     Visit Vitals  /79 (BP 1 Location: Left upper arm, BP Patient Position: Sitting, BP Cuff Size: Adult)   Pulse 71   Temp 98.4 °F (36.9 °C) (Temporal)   Resp 17   Ht 5' 9.02\" (1.753 m)   Wt 131 lb 6.4 oz (59.6 kg)   SpO2 100%   BMI 19.40 kg/m²       Wt Readings from Last 3 Encounters:   06/07/22 131 lb 6.4 oz (59.6 kg)   01/10/22 126 lb (57.2 kg)   09/24/21 124 lb 2 oz (56.3 kg)       Ht Readings from Last 3 Encounters:   06/07/22 5' 9.02\" (1.753 m)   01/10/22 5' 7.91\" (1.725 m)   09/24/21 5' 8.03\" (1.728 m)      Body mass index is 19.4 kg/m². Facility age limit for growth percentiles is 20 years. Facility age limit for growth percentiles is 20 years. Facility age limit for growth percentiles is 20 years.      General:  alert, cooperative, no distress, appears stated age   Oropharynx: normal    Eyes:  {normal    Ears:  {normal   Neck: {supple, no thyromegaly       Lung: No resp distress   Heart:  Pulse equal and normal   Abdomen: {nondistended   Extremities: extremities normal, atraumatic, no cyanosis or edema   Skin: Injection sites: normal   Pulses: 2+ and symmetric   Neuro: normal without focal findings  mental status, speech normal, alert and oriented x iii  MELISSA  reflexes normal and symmetric             Lab Review  Lab Results   Component Value Date/Time    Hemoglobin A1c 10.4 (H) 10/14/2020 03:57 PM    Hemoglobin A1c 9.7 (H) 10/07/2018 07:41 PM    Hemoglobin A1c 8.5 (H) 02/24/2018 12:18 PM    Hemoglobin A1c (POC) 7.8 06/07/2022 10:53 AM    Hemoglobin A1c (POC) 8.9 01/10/2022 11:23 AM    Hemoglobin A1c (POC) 8.8 09/24/2021 11:32 AM      Lab Results   Component Value Date/Time    Hemoglobin A1c 10.4 (H) 10/14/2020 03:57 PM      Lab Results   Component Value Date/Time    Glucose 426 (H) 10/14/2020 03:57 PM        Lab Results   Component Value Date/Time    TSH 2.700 01/29/2021 08:57 AM     Lab Results   Component Value Date/Time    Cholesterol, total 127 01/29/2021 08:57 AM    HDL Cholesterol 45 01/29/2021 08:57 AM    LDL, calculated 62 01/29/2021 08:57 AM    LDL, calculated 60 01/10/2019 12:29 PM    VLDL, calculated 20 01/29/2021 08:57 AM    VLDL, calculated 8 01/10/2019 12:29 PM    Triglyceride 110 (H) 01/29/2021 08:57 AM     Component      Latest Ref Rng & Units 1/29/2021 1/29/2021 1/29/2021 8: 62 AM  8:57 AM  8:57 AM   Creatinine, urine      Not Estab. mg/dL   222.8   Microalbumin, urine      Not Estab. ug/mL   16.4   Microalbumin/Creat. Ratio      0 - 29 mg/g creat   7   TSH      0.450 - 4.500 uIU/mL  2.700    t-Transglutaminase, IgA      0 - 3 U/mL <2       Reviewed more than 72 hours of data of CGMS     Blood sugar trends noted. Fluctuation in blood sugars: yes. Overnight blood sugar: 90 mg/dl to 220 mg/dl. Blood sugar during day time: trends: higher post meals,  Low blood sugars: occasional  Does very minimal boluses afraid of low blood sugars and tend to eat low carbs, doing better since he is on Control IQ  Insulin adjustment was made using these data and noted in assessment and plan section.      Assessment:   Diabetes Mellitus type I, under better control. Hypoglycemia: occasional  Blood sugar trends: reviewed  Insulin pump Insertion sites: normal   Plan:   Treatment changes  Basal rates ( time: units/hour): Humalog through insulin pump:   12 midnight: 0.95,  1.35 am: 10 pm, 0.9 Total basal insulin per day: 28 units/day.       Target blood sugar: 110 mg/dl, Carbohydrate correction breakfast: 1: 8, lunch: 1: 8, dinner:1:8.     Insulin sensitivity factor/ glucose correction factor: breakfast: 1: 45 Lunch: 1: 45, dinner: 1:45. Lantus dose for basal in case of pump failure: 28 units. Insulin dose reviewed and confirmed with the caregiver. Time spent counseling patient 25 minutes  2. Education:  interpretation of lab results, blood sugar goals, complications of diabetes mellitus, hypoglycemia prevention and treatment, exercise, insulin adjustments, illness management, SMBG skills, nutrition, site rotation, use of insulin pen, carbohydrate counting and self-injection of insulin  Importance of parental supervision stressed. Check urine ketones for:  · Blood sugar levels above 350 mg/dl  · Nausea and vomiting  · Other illnesses, including fever, diarrhea, common cold.   If ketones are negative, no change in your diabetes plan is needed  If ketones are trace or small:  Drink extra fluid (water or other calorie-free fluids)  Give insulin as you usually would base on your carbohydrate intake and your blood sugar level  Continue to check the urine ketones until they either go away, or until they increase to moderate or large  If ketones are moderate or large:  Call the diabetes team at [phone]- ask to speak to nurse and after hours/weekend/holidays ask for the pediatric endocrinologist on call.      Target Hemoglobin A1C= 7.5 % reviewed. Flu vaccine recommended every year.     Patient expressed understanding.     3. Compliance at present is estimated to be good. Efforts to improve compliance (if necessary) will be directed at increased exercise. Long term complications, Sick day management, treatment of low blood sugars, use of glucagon for hypoglycemic seizures and unconsciousness reviewed. Change pump site every 3 days and rotation of insertion sites reviewed. Hemoglobin A1C reviewed. Correlation between A1C and long term complications like neuropathy, nephropathy and retinopathy reviewed. Acute complications like diabetes ketoacidosis and dehydration and electrolyte abnormalities discussed. Annual screen labs: due on: none (TSH, Lipid profile, Urine microalbumin, celiac screen). Annual eye exam: stressed. Need to review the blood sugars periodically if blood sugars are out of range as discussed in the clinic consistently. School forms: none. Prescriptions:yes. Total time with patient 40 minutes. Follow up in 3 months. Encourage patient to apply for control IQ and provided information and steps involved again. Reviewed the treatment of low blood sugar and the concerns he has with hypoglycemia and control IQ should be helping him too.

## 2022-06-07 NOTE — PROGRESS NOTES
[facility] Encounter:    Patient is on the CIQ with Tandem    Recent Results (from the past 12 hour(s))   AMB POC HEMOGLOBIN A1C    Collection Time: 06/07/22 10:53 AM   Result Value Ref Range    Hemoglobin A1c (POC) 7.8 %     No carbohydrate entries, Basal 28 units and TDD 32 units.

## 2022-09-26 RX ORDER — BLOOD SUGAR DIAGNOSTIC
STRIP MISCELLANEOUS
Qty: 600 STRIP | Refills: 16 | Status: SHIPPED | OUTPATIENT
Start: 2022-09-26

## 2022-10-21 ENCOUNTER — OFFICE VISIT (OUTPATIENT)
Dept: PEDIATRIC ENDOCRINOLOGY | Age: 22
End: 2022-10-21
Payer: COMMERCIAL

## 2022-10-21 VITALS
HEIGHT: 68 IN | OXYGEN SATURATION: 100 % | HEART RATE: 78 BPM | TEMPERATURE: 98.2 F | DIASTOLIC BLOOD PRESSURE: 80 MMHG | BODY MASS INDEX: 19.44 KG/M2 | RESPIRATION RATE: 17 BRPM | WEIGHT: 128.25 LBS | SYSTOLIC BLOOD PRESSURE: 125 MMHG

## 2022-10-21 DIAGNOSIS — E10.9 TYPE 1 DIABETES MELLITUS WITHOUT COMPLICATION (HCC): Primary | ICD-10-CM

## 2022-10-21 LAB — HBA1C MFR BLD HPLC: 7.5 %

## 2022-10-21 PROCEDURE — 95251 CONT GLUC MNTR ANALYSIS I&R: CPT | Performed by: PEDIATRICS

## 2022-10-21 PROCEDURE — 3051F HG A1C>EQUAL 7.0%<8.0%: CPT | Performed by: PEDIATRICS

## 2022-10-21 PROCEDURE — 83036 HEMOGLOBIN GLYCOSYLATED A1C: CPT | Performed by: PEDIATRICS

## 2022-10-21 PROCEDURE — 99215 OFFICE O/P EST HI 40 MIN: CPT | Performed by: PEDIATRICS

## 2022-10-21 RX ORDER — GLUCAGON 3 MG/1
POWDER NASAL
Qty: 2 EACH | Refills: 0 | Status: SHIPPED | OUTPATIENT
Start: 2022-10-21

## 2022-10-21 NOTE — PROGRESS NOTES
[facility]  [address] [address]  [phone]        Cc: Type 1 diabetes          On insulin pump: Tandem control IQ          Fluctuation of blood sugars          Low blood sugars: occasional          Other: CGM S          Fear of low blood sugars. [facility]:  Nkechi Collins is a 25years old male who presents for follow up evaluation of Type 1 diabetes mellitus. Wearing CGMS and liking it and he has fear of low blood sugars and is getting better and now he allows blood sugars to be in the 90's before treatment. He is on tandem control IQ insulin pump and he has not given any bolus for his meals. Change settings to sleep mode for 24 hours a day and would like his basal rates to go up and started getting boluses because of the fear of low blood sugar. Blood sugar review shows that the target range between 70-1 80 he is around 30%. He does have fluctuation of blood sugars. He works 40 hours/week at [employer] which involves walking almost close to 5 miles per day and has to lift more weights. He is not interested in putting the activity mode for the pump. Checking 4-6 blood sugars per day. His clinical course has been stable. Insulin dosage review with Todd's caregiver suggested compliance most of the time. Associated symptoms of hyperglycemia have included : none . Associated symptoms of hypoglycemia have included: jitteriness, sweating and hunger. Treatment of low blood sugar: appropriate. He is currently taking: Humalog through : insulin pump: Tandem insulin pump was reviewed:  .change of insulin insertion sites: every 3 days. Any problems with insertion sites: none. Compliance with blood gucose monitoring: good. He does 3 meals ad 1-2 snacks, he has low carb meals 25-30 grams per meal, carb for snacks are arund 15 grams. He is working full time at [employer] between 6 am to 3 pm. The patient  does perform independently.  Exercise: intermittently Meal planning: He is using avoidance of concentrated sweets, carbohydrate counting. . Blood glucose times and ranges: See scanned log . MedicAlert Identification Noted? no.  Past Medical History:   Diagnosis Date    Diabetes ([address]     type 1    Seizures (HCC)        Past Surgical History:   Procedure Laterality Date    HX ORTHOPAEDIC      club foot RIGHT       Family History   Problem Relation Age of Onset    No Known Problems Mother     No Known Problems Father     Diabetes Neg Hx     Thyroid Disease Neg Hx        Current Outpatient Medications   Medication Sig Dispense Refill    glucagon (Baqsimi) 3 mg/actuation nasal spray Spray one device in one nostril for severe hypoglycemia or unconsciousness. Please label seperately. Disp 2 1- home 1 school 2 Each 0    Contour Next Test Strips strip TEST UP TO 6 TIMES DAILY OR AS DIRECTED BY PHYSICIAN 600 Strip 16    insulin pump (PATIENT SUPPLIED) [facility] by SubCUTAneous route continuous. insulin lispro (HumaLOG U-100 Insulin) 100 unit/mL injection INJECT UP  UNITS DAILY VIA INSULIN PUMP. 90 mL 0    glucose blood VI test strips (Contour Next Test Strips) strip TEST UP TO 6 TIMES DAILY OR AS DIRECTED BY PHYSICIAN 600 Strip 3    Blood-Glucose Meter (Contour Next One Meter) misc Use to test blood sugar. Disp 2 1 home 1 school 2 Each 0    glucose blood VI test strips (Contour Next Test Strips) strip Test blood sugar up to 6 times daily 200 Strip 4    insulin glargine (BASAGLAR KWIKPEN U-100 INSULIN) 100 unit/mL (3 mL) inpn Inject 30 units daily for pump failure 10 mL 3    Acetone, Urine, Test (KETOSTIX) strip Check urine ketones if blood sugar >350 or illness.  Disp 2- 1 home,  1-school 100 Strip 4    lancets misc Check blood sugar up to 6x daily 200 Each 4     No Known Allergies    Social History     Socioeconomic History    Marital status: SINGLE     Spouse name: Not on file    Number of children: Not on file    Years of education: Not on file    Highest education level: Not on file Occupational History    Not on file   Tobacco Use    Smoking status: Never     Passive exposure: Yes    Smokeless tobacco: Never   Substance and Sexual Activity    Alcohol use: Yes    Drug use: No    Sexual activity: Never   Other Topics Concern    Not on file   Social History Narrative    Not on file     Social Determinants of Health     Financial Resource Strain: Not on file   Food Insecurity: Not on file   Transportation Needs: Not on file   Physical Activity: Not on file   Stress: Not on file   Social Connections: Not on file   Intimate Partner Violence: Not on file   Housing Stability: Not on file   Review of Systems  Constitutional: good energy, ENT: normal hearing, no sorethroat   Eye: normal vision, denied double vision, photophobia, blurred vision  Respiratory system: no wheezing, no respiratory discomfort  CVS: no palpitations, no pedal edema, GI: normal bowel movements, no abdominal pain  Allergy: no skin rash or angioedema, Neurological: no headache, no focal weakness, burning sensation of feet: no, 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 He complains of pain and stretching on the outer sole of the right foot. behavioural: behavior: normal, mood; normal, Skin: no rash or itching, injection sites: no.   Objective:     Visit Vitals  /80 (BP 1 Location: Left arm, BP Patient Position: Sitting)   Pulse 78   Temp 98.2 °F (36.8 °C) (Oral)   Resp 17   Ht 5' 8.03\" (1.728 m)   Wt 128 lb 4 oz (58.2 kg)   SpO2 100%   BMI 19.48 kg/m²       Wt Readings from Last 3 Encounters:   10/21/22 128 lb 4 oz (58.2 kg)   06/07/22 131 lb 6.4 oz (59.6 kg)   01/10/22 126 lb (57.2 kg)       Ht Readings from Last 3 Encounters:   10/21/22 5' 8.03\" (1.728 m)   06/07/22 5' 9.02\" (1.753 m)   01/10/22 5' 7.91\" (1.725 m)      Body mass index is 19.48 kg/m². Facility age limit for growth percentiles is 20 years. Facility age limit for growth percentiles is 20 years.    Facility age limit for growth percentiles is 20 years. General:  alert, cooperative, no distress, appears stated age   Oropharynx: normal    Eyes:  {normal    Ears:  {normal   Neck: {supple, no thyromegaly       Lung: No resp distress   Heart:  Pulse equal and normal   Abdomen: {nondistended   Extremities: extremities normal, atraumatic, no cyanosis or edema   Skin: Injection sites: normal   Pulses: 2+ and symmetric   Neuro: normal without focal findings  mental status, speech normal, alert and oriented x iii  MELISSA  reflexes normal and symmetric    Foot exam-has callus formation on the outside of the sole of the right foot. Monofilament was used to check sensation and they are intact. Lab Review  Lab Results   Component Value Date/Time    Hemoglobin A1c 10.4 (H) 10/14/2020 03:57 PM    Hemoglobin A1c 9.7 (H) 10/07/2018 07:41 PM    Hemoglobin A1c 8.5 (H) 02/24/2018 12:18 PM    Hemoglobin A1c (POC) 7.5 10/21/2022 10:45 AM    Hemoglobin A1c (POC) 7.8 06/07/2022 10:53 AM    Hemoglobin A1c (POC) 8.9 01/10/2022 11:23 AM      Lab Results   Component Value Date/Time    Hemoglobin A1c 10.4 (H) 10/14/2020 03:57 PM      Lab Results   Component Value Date/Time    Glucose 426 (H) 10/14/2020 03:57 PM        Lab Results   Component Value Date/Time    TSH 2.700 01/29/2021 08:57 AM     Lab Results   Component Value Date/Time    Cholesterol, total 127 01/29/2021 08:57 AM    HDL Cholesterol 45 01/29/2021 08:57 AM    LDL, calculated 62 01/29/2021 08:57 AM    LDL, calculated 60 01/10/2019 12:29 PM    VLDL, calculated 20 01/29/2021 08:57 AM    VLDL, calculated 8 01/10/2019 12:29 PM    Triglyceride 110 (H) 01/29/2021 08:57 AM     Component      Latest Ref Rng & Units 1/29/2021 1/29/2021 1/29/2021           8:57 AM  8:57 AM  8:57 AM   Creatinine, urine      Not Estab. mg/dL   222.8   Microalbumin, urine      Not Estab. ug/mL   16.4   Microalbumin/Creat.  Ratio      0 - 29 mg/g creat   7   TSH      0.450 - 4.500 uIU/mL  2.700    t-Transglutaminase, IgA      0 - 3 U/mL <2       Reviewed more than 72 hours of data of CGMS     Blood sugar trends noted. Fluctuation in blood sugars: yes. Overnight blood sugar: 90 mg/dl to 200 mg/dl. Blood sugar during day time: trends: higher post meals,  Low blood sugars: occasional  Does not do insulin boluses and relies on increased basal done by the insulin pump as it is set for sleep mode for 24 hours per day. Assessment:   Diabetes Mellitus type I, under better control. Hypoglycemia: occasional  Blood sugar trends: reviewed  Insulin pump Insertion sites: normal   Right clubfoot status postrepair has callus on the right sole of the feet also pain-referred to podiatrist.  Referral also was given for ophthalmology for dilated eye exam  Plan:   Treatment changes  Basal rates ( time: units/hour): Humalog through insulin pump:   12 midnight: 0.95,  1.35 am: 10 pm, 0.9 Total basal insulin per day: 26.5 units/day. Target blood sugar: 110 mg/dl, Carbohydrate correction breakfast: 1: 8, lunch: 1: 8, dinner:1:8.     Insulin sensitivity factor/ glucose correction factor: breakfast: 1: 45 Lunch: 1: 45, dinner: 1:45. Lantus dose for basal in case of pump failure: 27 units. Insulin dose reviewed and confirmed with the caregiver. Time spent counseling patient 25 minutes  2. Education:  interpretation of lab results, blood sugar goals, complications of diabetes mellitus, hypoglycemia prevention and treatment, exercise, insulin adjustments, illness management, SMBG skills, nutrition, site rotation, use of insulin pen, carbohydrate counting and self-injection of insulin  Importance of parental supervision stressed. Check urine ketones for:  Blood sugar levels above 350 mg/dl  Nausea and vomiting  Other illnesses, including fever, diarrhea, common cold.   If ketones are negative, no change in your diabetes plan is needed  If ketones are trace or small:  Drink extra fluid (water or other calorie-free fluids)  Give insulin as you usually would base on your carbohydrate intake and your blood sugar level  Continue to check the urine ketones until they either go away, or until they increase to moderate or large  If ketones are moderate or large:  Call the diabetes team at [phone]- ask to speak to nurse and after hours/weekend/holidays ask for the pediatric endocrinologist on call. Target Hemoglobin A1C= 7.5 % reviewed. Flu vaccine recommended every year. Patient expressed understanding. 3.  Compliance at present is estimated to be good. Efforts to improve compliance (if necessary) will be directed at increased exercise. Long term complications, Sick day management, treatment of low blood sugars, use of glucagon for hypoglycemic seizures and unconsciousness reviewed. Change pump site every 3 days and rotation of insertion sites reviewed. Hemoglobin A1C reviewed. Correlation between A1C and long term complications like neuropathy, nephropathy and retinopathy reviewed. Acute complications like diabetes ketoacidosis and dehydration and electrolyte abnormalities discussed. Annual screen labs: due on: none (TSH, Lipid profile, Urine microalbumin, celiac screen). Annual eye exam: stressed. Need to review the blood sugars periodically if blood sugars are out of range as discussed in the clinic consistently. School forms: none. Prescriptions:yes. Total time with patient 40 minutes. Follow up in 3 months. Importance of bolusing before each meal and setting the pump for activity noted during his work time and blood sugar fluctuations was reviewed. Impact of blood sugar fluctuation on long-term complications was discussed. Patient does not want to change any of the settings of the pump at present. no difficulties

## 2022-10-21 NOTE — PROGRESS NOTES
[facility] Encounter with Haley Bhakta for follow up of type 1 diabetes. Attended today's appointment on his own. Recent Results (from the past 12 hour(s))   AMB POC HEMOGLOBIN A1C    Collection Time: 10/21/22 10:45 AM   Result Value Ref Range    Hemoglobin A1c (POC) 7.5 %     Lab Results   Component Value Date/Time    Hemoglobin A1c 10.4 (H) 10/14/2020 03:57 PM    Hemoglobin A1c 9.7 (H) 10/07/2018 07:41 PM    Hemoglobin A1c (POC) 7.5 10/21/2022 10:45 AM    Hemoglobin A1c (POC) 7.8 06/07/2022 10:53 AM      Lab Results   Component Value Date/Time    Glucose 426 (H) 10/14/2020 03:57 PM       [x] Glucagon - Baqsimi, how to use? Yes Expiration date? New Rx today with copay card    Tandem pump warranty expiring soon, Skipper Going has contacted [employer] to request new device but has not heard back. [facility] to contact Sarah Vincent [employer] for further inquiry.     Abbi Buchanan RD, [facility]

## 2023-01-24 ENCOUNTER — OFFICE VISIT (OUTPATIENT)
Dept: PEDIATRIC ENDOCRINOLOGY | Age: 23
End: 2023-01-24
Payer: COMMERCIAL

## 2023-01-24 VITALS
HEART RATE: 79 BPM | BODY MASS INDEX: 19.76 KG/M2 | DIASTOLIC BLOOD PRESSURE: 77 MMHG | HEIGHT: 68 IN | WEIGHT: 130.38 LBS | TEMPERATURE: 98.3 F | SYSTOLIC BLOOD PRESSURE: 121 MMHG | OXYGEN SATURATION: 99 % | RESPIRATION RATE: 15 BRPM

## 2023-01-24 DIAGNOSIS — E10.9 TYPE 1 DIABETES MELLITUS WITHOUT COMPLICATION (HCC): Primary | ICD-10-CM

## 2023-01-24 LAB — HBA1C MFR BLD HPLC: 7.2 %

## 2023-01-24 PROCEDURE — 3051F HG A1C>EQUAL 7.0%<8.0%: CPT | Performed by: PEDIATRICS

## 2023-01-24 PROCEDURE — 99215 OFFICE O/P EST HI 40 MIN: CPT | Performed by: PEDIATRICS

## 2023-01-24 PROCEDURE — 95251 CONT GLUC MNTR ANALYSIS I&R: CPT | Performed by: PEDIATRICS

## 2023-01-24 PROCEDURE — 83036 HEMOGLOBIN GLYCOSYLATED A1C: CPT | Performed by: PEDIATRICS

## 2023-01-24 NOTE — PROGRESS NOTES
[facility] Brief Encounter      Met with Joao. He has fear of lows. Reviewed CIQ exercise mode,reviewed bolus detached to allow for 60 minutes without intervention.  Handout given for ranges and increase basal and bolus via CIQ

## 2023-01-24 NOTE — PROGRESS NOTES
[address] [facility] [place].  [address] [address]  [phone]        Cc: Type 1 diabetes          On insulin pump: Tandem control IQ          Fluctuation of blood sugars          Low blood sugars: occasional          Other: CGM S          Fear of low blood sugars. [facility]:  Tata Guevara is a 25 years and 8 months old male who presents for follow up evaluation of Type 1 diabetes mellitus. Wearing CGMS and liking it and he has fear of low blood sugars and is getting better and now he allows blood sugars to be in the 80-90's before treatment. He is on tandem control IQ insulin pump and does not give any bolus for his meals. Change settings to sleep mode for 24 hours a day and would like his basal rates to go up and started getting boluses because of the fear of low blood sugar. Blood sugar review shows that the target range between 70-1 80 he is around 40%. He does have fluctuation of blood sugars. He works 40 hours/week at [employer] which involves walking almost close to 5 miles per day and has to lift more weights. He is not interested in putting the activity mode for the pump. Checking 4-6 blood sugars per day. His clinical course has been stable. Insulin dosage review with Todd's caregiver suggested compliance most of the time. Associated symptoms of hyperglycemia have included : none . Associated symptoms of hypoglycemia have included: jitteriness, sweating and hunger. Treatment of low blood sugar: appropriate. He is currently taking: Humalog through : insulin pump: Tandem insulin pump was reviewed:  .change of insulin insertion sites: every 3 days. Any problems with insertion sites: none. Compliance with blood gucose monitoring: good. He does 3 meals ad 1-2 snacks, he has low carb meals 25-30 grams per meal, carb for snacks are arund 15 grams. He is working full time at [employer] between 6 am to 3 pm. The patient  does perform independently.  Exercise: intermittently Meal planning: He is using avoidance of concentrated sweets, carbohydrate counting. . Blood glucose times and ranges: See scanned log . MedicAlert Identification Noted? no.  Past Medical History:   Diagnosis Date    Diabetes ([address]     type 1    Seizures (HCC)        Past Surgical History:   Procedure Laterality Date    HX ORTHOPAEDIC      club foot RIGHT       Family History   Problem Relation Age of Onset    No Known Problems Mother     No Known Problems Father     Diabetes Neg Hx     Thyroid Disease Neg Hx        Current Outpatient Medications   Medication Sig Dispense Refill    glucagon (Baqsimi) 3 mg/actuation nasal spray Spray one device in one nostril for severe hypoglycemia or unconsciousness. Please label seperately. Disp 2 1- home 1 school 2 Each 0    Contour Next Test Strips strip TEST UP TO 6 TIMES DAILY OR AS DIRECTED BY PHYSICIAN 600 Strip 16    insulin pump (PATIENT SUPPLIED) [facility] [place] SubCUTAneous route continuous. insulin lispro (HumaLOG U-100 Insulin) 100 unit/mL injection INJECT UP  UNITS DAILY VIA INSULIN PUMP. 90 mL 0    glucose blood VI test strips (Contour Next Test Strips) strip TEST UP TO 6 TIMES DAILY OR AS DIRECTED BY PHYSICIAN 600 Strip 3    Blood-Glucose Meter (Contour Next One Meter) misc Use to test blood sugar. Disp 2 1 home 1 school 2 Each 0    glucose blood VI test strips (Contour Next Test Strips) strip Test blood sugar up to 6 times daily 200 Strip 4    insulin glargine (BASAGLAR KWIKPEN U-100 INSULIN) 100 unit/mL (3 mL) inpn Inject 30 units daily for pump failure 10 mL 3    Acetone, Urine, Test (KETOSTIX) strip Check urine ketones if blood sugar >350 or illness.  Disp 2- 1 home,  1-school 100 Strip 4    lancets misc Check blood sugar up to 6x daily 200 Each 4     No Known Allergies    Social History     Socioeconomic History    Marital status: SINGLE     Spouse name: Not on file    Number of children: Not on file    Years of education: Not on file    Highest education level: Not on file   Occupational History    Not on file   Tobacco Use    Smoking status: Never     Passive exposure: Yes    Smokeless tobacco: Never   Substance and Sexual Activity    Alcohol use: Yes    Drug use: No    Sexual activity: Never   Other Topics Concern    Not on file   Social History Narrative    Not on file     Social Determinants of Health     Financial Resource Strain: Not on file   Food Insecurity: Not on file   Transportation Needs: Not on file   Physical Activity: Not on file   Stress: Not on file   Social Connections: Not on file   Intimate Partner Violence: Not on file   Housing Stability: Not on file   Review of Systems  Constitutional: good energy, ENT: normal hearing, no sorethroat   Eye: normal vision, denied double vision, photophobia, blurred vision  Respiratory system: no wheezing, no respiratory discomfort  CVS: no palpitations, no pedal edema, GI: normal bowel movements, no abdominal pain  Allergy: no skin rash or angioedema, Neurological: no headache, no focal weakness, burning sensation of feet: no, 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 He complains of pain and stretching on the outer sole of the right foot. behavioural: behavior: normal, mood; normal, Skin: no rash or itching, injection sites: no.   Objective:     Visit Vitals  /77 (BP 1 Location: Right arm, BP Patient Position: Sitting)   Pulse 79   Temp 98.3 °F (36.8 °C) (Oral)   Resp 15   Ht 5' 7.95\" (1.726 m)   Wt 130 lb 6 oz (59.1 kg)   SpO2 99%   BMI 19.85 kg/m²       Wt Readings from Last 3 Encounters:   01/24/23 130 lb 6 oz (59.1 kg)   10/21/22 128 lb 4 oz (58.2 kg)   06/07/22 131 lb 6.4 oz (59.6 kg)       Ht Readings from Last 3 Encounters:   01/24/23 5' 7.95\" (1.726 m)   10/21/22 5' 8.03\" (1.728 m)   06/07/22 5' 9.02\" (1.753 m)      Body mass index is 19.85 kg/m². Facility age limit for growth percentiles is 20 years. Facility age limit for growth percentiles is 20 years. Facility age limit for growth percentiles is 20 years. General:  alert, cooperative, no distress, appears stated age   Oropharynx: normal    Eyes:  {normal    Ears:  {normal   Neck: {supple, no thyromegaly       Lung: No resp distress   Heart:  Pulse equal and normal   Abdomen: {nondistended   Extremities: extremities normal, atraumatic, no cyanosis or edema   Skin: Injection sites: normal   Pulses: 2+ and symmetric   Neuro: normal without focal findings  mental status, speech normal, alert and oriented x iii  MELISSA  reflexes normal and symmetric    Foot exam-has callus formation on the outside of the sole of the right foot. Lab Review  Lab Results   Component Value Date/Time    Hemoglobin A1c 10.4 (H) 10/14/2020 03:57 PM    Hemoglobin A1c 9.7 (H) 10/07/2018 07:41 PM    Hemoglobin A1c 8.5 (H) 02/24/2018 12:18 PM    Hemoglobin A1c (POC) 7.5 10/21/2022 10:45 AM    Hemoglobin A1c (POC) 7.8 06/07/2022 10:53 AM    Hemoglobin A1c (POC) 8.9 01/10/2022 11:23 AM      Lab Results   Component Value Date/Time    Hemoglobin A1c 10.4 (H) 10/14/2020 03:57 PM      Lab Results   Component Value Date/Time    Glucose 426 (H) 10/14/2020 03:57 PM        Lab Results   Component Value Date/Time    TSH 2.700 01/29/2021 08:57 AM     Lab Results   Component Value Date/Time    Cholesterol, total 127 01/29/2021 08:57 AM    HDL Cholesterol 45 01/29/2021 08:57 AM    LDL, calculated 62 01/29/2021 08:57 AM    LDL, calculated 60 01/10/2019 12:29 PM    VLDL, calculated 20 01/29/2021 08:57 AM    VLDL, calculated 8 01/10/2019 12:29 PM    Triglyceride 110 (H) 01/29/2021 08:57 AM     Component      Latest Ref Rng & Units 1/29/2021 1/29/2021 1/29/2021           8:57 AM  8:57 AM  8:57 AM   Creatinine, urine      Not Estab. mg/dL   222.8   Microalbumin, urine      Not Estab. ug/mL   16.4   Microalbumin/Creat.  Ratio      0 - 29 mg/g creat   7   TSH      0.450 - 4.500 uIU/mL  2.700    t-Transglutaminase, IgA      0 - 3 U/mL <2       Reviewed more than 72 hours of data of CGMS     Blood sugar trends noted. Fluctuation in blood sugars: yes. Overnight blood sugar: 90 mg/dl to 180 mg/dl. Blood sugar during day time: trends: higher post meals,  Low blood sugars: occasional  Does not do insulin boluses and relies on increased basal done by the insulin pump as it is set for sleep mode for 24 hours per day. Does not like to go on exercise mode during day time at work and he fears the insulin bolus. Assessment:   Diabetes Mellitus type I, under better control. Hypoglycemia: occasional  Blood sugar trends: reviewed  Insulin pump Insertion sites: normal   Right clubfoot status postrepair has callus on the right sole of the feet also pain-referred to podiatrist- not made the appointment yet and we will do it soon. Referral also was given for ophthalmology for dilated eye exam, not done yet and will make it soon. Plan:   Treatment changes  Basal rates ( time: units/hour): Humalog through insulin pump:   12 midnight: 0.95,  1.45 am: 10 pm, 0.9 Total basal insulin per day: 28 units/day. Target blood sugar: 110 mg/dl, Carbohydrate correction breakfast: 1: 9, lunch: 1:9, dinner:1:9.     Insulin sensitivity factor/ glucose correction factor: breakfast: 1: 45 Lunch: 1: 45, dinner: 1:45. Lantus dose for basal in case of pump failure: 28 units. Insulin dose reviewed and confirmed with the caregiver. Time spent counseling patient 25 minutes  2. Education:  interpretation of lab results, blood sugar goals, complications of diabetes mellitus, hypoglycemia prevention and treatment, exercise, insulin adjustments, illness management, SMBG skills, nutrition, site rotation, use of insulin pen, carbohydrate counting and self-injection of insulin  Importance of parental supervision stressed. Check urine ketones for:  Blood sugar levels above 350 mg/dl  Nausea and vomiting  Other illnesses, including fever, diarrhea, common cold.   If ketones are negative, no change in your diabetes plan is needed  If ketones are trace or small:  Drink extra fluid (water or other calorie-free fluids)  Give insulin as you usually would base on your carbohydrate intake and your blood sugar level  Continue to check the urine ketones until they either go away, or until they increase to moderate or large  If ketones are moderate or large:  Call the diabetes team at [phone]- ask to speak to nurse and after hours/weekend/holidays ask for the pediatric endocrinologist on call. Target Hemoglobin A1C= 7.5 % reviewed. Flu vaccine recommended every year. Patient expressed understanding. 3.  Compliance at present is estimated to be good. Efforts to improve compliance (if necessary) will be directed at increased exercise. Long term complications, Sick day management, treatment of low blood sugars, use of glucagon for hypoglycemic seizures and unconsciousness reviewed. Change pump site every 3 days and rotation of insertion sites reviewed. Hemoglobin A1C reviewed. Correlation between A1C and long term complications like neuropathy, nephropathy and retinopathy reviewed. Acute complications like diabetes ketoacidosis and dehydration and electrolyte abnormalities discussed. Annual screen labs: due on: ordered (TSH, Lipid profile, Urine microalbumin, celiac screen). Annual eye exam: stressed. Need to review the blood sugars periodically if blood sugars are out of range as discussed in the clinic consistently. School forms: none. Prescriptions:yes. Total time with patient 40 minutes. Follow up in 3 months. Importance of bolusing before each meal and setting the pump for activity noted during his work time and blood sugar fluctuations was reviewed. Impact of blood sugar fluctuation on long-term complications was discussed.

## 2023-01-25 LAB
ALBUMIN/CREAT UR: 3 MG/G CREAT (ref 0–29)
CHOLEST SERPL-MCNC: 115 MG/DL (ref 100–199)
CREAT UR-MCNC: 119.5 MG/DL
HDLC SERPL-MCNC: 54 MG/DL
IMP & REVIEW OF LAB RESULTS: NORMAL
LDLC SERPL CALC-MCNC: 50 MG/DL (ref 0–99)
MICROALBUMIN UR-MCNC: 3.2 UG/ML
TRIGL SERPL-MCNC: 47 MG/DL (ref 0–149)
TSH SERPL DL<=0.005 MIU/L-ACNC: 2.27 UIU/ML (ref 0.45–4.5)
TTG IGA SER-ACNC: <2 U/ML (ref 0–3)
VLDLC SERPL CALC-MCNC: 11 MG/DL (ref 5–40)

## 2023-02-10 DIAGNOSIS — E10.9 TYPE 1 DIABETES MELLITUS WITHOUT COMPLICATION (HCC): ICD-10-CM

## 2023-02-10 RX ORDER — INSULIN LISPRO 100 [IU]/ML
INJECTION, SOLUTION INTRAVENOUS; SUBCUTANEOUS
Qty: 90 ML | Refills: 0 | Status: SHIPPED | OUTPATIENT
Start: 2023-02-10

## 2023-04-25 ENCOUNTER — OFFICE VISIT (OUTPATIENT)
Dept: PEDIATRIC ENDOCRINOLOGY | Age: 23
End: 2023-04-25
Payer: COMMERCIAL

## 2023-04-25 VITALS
OXYGEN SATURATION: 100 % | TEMPERATURE: 97.8 F | SYSTOLIC BLOOD PRESSURE: 120 MMHG | DIASTOLIC BLOOD PRESSURE: 83 MMHG | WEIGHT: 130 LBS | HEIGHT: 68 IN | HEART RATE: 80 BPM | BODY MASS INDEX: 19.7 KG/M2

## 2023-04-25 DIAGNOSIS — E10.9 TYPE 1 DIABETES MELLITUS WITHOUT COMPLICATION (HCC): Primary | ICD-10-CM

## 2023-04-25 LAB — HBA1C MFR BLD HPLC: 7 %

## 2023-04-25 PROCEDURE — 83036 HEMOGLOBIN GLYCOSYLATED A1C: CPT | Performed by: PEDIATRICS

## 2023-04-25 PROCEDURE — 99215 OFFICE O/P EST HI 40 MIN: CPT | Performed by: PEDIATRICS

## 2023-04-25 PROCEDURE — 95251 CONT GLUC MNTR ANALYSIS I&R: CPT | Performed by: PEDIATRICS

## 2023-04-25 PROCEDURE — 3051F HG A1C>EQUAL 7.0%<8.0%: CPT | Performed by: PEDIATRICS

## 2023-04-25 NOTE — PROGRESS NOTES
[facility] Encounter with Reina Noland for follow up of type 1 diabetes. Attended appointment today on his own. Recent Results (from the past 12 hour(s))   AMB POC HEMOGLOBIN A1C    Collection Time: 04/25/23 10:48 AM   Result Value Ref Range    Hemoglobin A1c (POC) 7.0 %       Lab Results   Component Value Date/Time    Hemoglobin A1c 10.4 (H) 10/14/2020 03:57 PM    Hemoglobin A1c 9.7 (H) 10/07/2018 07:41 PM    Hemoglobin A1c (POC) 7.0 04/25/2023 10:48 AM    Hemoglobin A1c (POC) 7.2 01/24/2023 11:21 AM        Lab Results   Component Value Date/Time    Glucose 426 (H) 10/14/2020 03:57 PM       Insights from device download: Rarely inputting carbs for fear of hypoglycemia. Using sleep mode most of the day to prevent CIQ boluses, cannot keep up with hourly bolus for activity mode. Adult transition paperwork provided.        Abbi Buchanan RD, [facility]

## 2023-04-25 NOTE — PROGRESS NOTES
[facility]  [address] [address]  [phone]        Cc: Type 1 diabetes          On insulin pump: Tandem control IQ          Fluctuation of blood sugars          Low blood sugars: occasional          Other: CGM S          Fear of low blood sugars. [facility]:  Iam Wetzel is a 26 years and 3 months old male who presents for follow up evaluation of Type 1 diabetes mellitus. Wearing CGMS and he still has fear of low blood sugars and is getting better and now he allows blood sugars to be in the 80-90's before treatment. He is on tandem control IQ insulin pump and does not give any bolus for his meals. He has changed settings to sleep mode for 24 hours a day and would like his basal rates to go up and started getting boluses because of the fear of low blood sugar. Blood sugar review shows that the target range between 70-1 80 he is around 40%. He does have fluctuation of blood sugars. He works 40 hours/week at [employer] which involves walking almost close to 5 miles per day and has to lift more weights. He is not interested in putting the activity mode for the pump. Checking 4-6 blood sugars per day. His clinical course has been stable. Insulin dosage review with Todd's caregiver suggested compliance most of the time. Associated symptoms of hyperglycemia have included : none . Associated symptoms of hypoglycemia have included: jitteriness, sweating and hunger. Treatment of low blood sugar: appropriate. He is currently taking: Humalog through : insulin pump: Tandem insulin pump was reviewed:  .change of insulin insertion sites: every 3 days. Any problems with insertion sites: none. Compliance with blood gucose monitoring: good. He does 3 meals ad 1-2 snacks, he has low carb meals 25-30 grams per meal, carb for snacks are arund 15 grams. He is working full time at [employer] between 6 am to 3 pm. The patient  does perform independently.  Exercise: intermittently Meal planning: He is using avoidance of concentrated sweets, carbohydrate counting. . Blood glucose times and ranges: See scanned log . MedicAlert Identification Noted? no.  Past Medical History:   Diagnosis Date    Diabetes ([address]     type 1    Seizures (HCC)        Past Surgical History:   Procedure Laterality Date    HX ORTHOPAEDIC      club foot RIGHT       Family History   Problem Relation Age of Onset    No Known Problems Mother     No Known Problems Father     Diabetes Neg Hx     Thyroid Disease Neg Hx        Current Outpatient Medications   Medication Sig Dispense Refill    insulin lispro (HumaLOG U-100 Insulin) 100 unit/mL injection INJECT UP  UNITS DAILY VIA INSULIN PUMP. 90 mL 0    glucagon (Baqsimi) 3 mg/actuation nasal spray Spray one device in one nostril for severe hypoglycemia or unconsciousness. Please label seperately. Disp 2 1- home 1 school 2 Each 0    Contour Next Test Strips strip TEST UP TO 6 TIMES DAILY OR AS DIRECTED BY PHYSICIAN 600 Strip 16    insulin pump (PATIENT SUPPLIED) mis by SubCUTAneous route continuous. glucose blood VI test strips (Contour Next Test Strips) strip TEST UP TO 6 TIMES DAILY OR AS DIRECTED BY PHYSICIAN 600 Strip 3    Blood-Glucose Meter (Contour Next One Meter) misc Use to test blood sugar. Disp 2 1 home 1 school 2 Each 0    glucose blood VI test strips (Contour Next Test Strips) strip Test blood sugar up to 6 times daily 200 Strip 4    insulin glargine (BASAGLAR KWIKPEN U-100 INSULIN) 100 unit/mL (3 mL) inpn Inject 30 units daily for pump failure 10 mL 3    Acetone, Urine, Test (KETOSTIX) strip Check urine ketones if blood sugar >350 or illness.  Disp 2- 1 home,  1-school 100 Strip 4    lancets misc Check blood sugar up to 6x daily 200 Each 4     No Known Allergies    Social History     Socioeconomic History    Marital status: SINGLE     Spouse name: Not on file    Number of children: Not on file    Years of education: Not on file    Highest education level: Not on file   Occupational History    Not on file   Tobacco Use    Smoking status: Never     Passive exposure: Yes    Smokeless tobacco: Never   Substance and Sexual Activity    Alcohol use: Yes    Drug use: No    Sexual activity: Never   Other Topics Concern    Not on file   Social History Narrative    Not on file     Social Determinants of Health     Financial Resource Strain: Not on file   Food Insecurity: Not on file   Transportation Needs: Not on file   Physical Activity: Not on file   Stress: Not on file   Social Connections: Not on file   Intimate Partner Violence: Not on file   Housing Stability: Not on file   Review of Systems  Constitutional: good energy, ENT: normal hearing, no sorethroat   Eye: normal vision, denied double vision, photophobia, blurred vision  Respiratory system: no wheezing, no respiratory discomfort  CVS: no palpitations, no pedal edema, GI: normal bowel movements, no abdominal pain  Allergy: no skin rash or angioedema, Neurological: no headache, no focal weakness, burning sensation of feet: no, 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 He complains of pain and stretching on the outer sole of the right foot. behavioural: behavior: normal, mood; normal, Skin: no rash or itching, injection sites: no.   Objective:     Visit Vitals  /83 (BP 1 Location: Left upper arm, BP Patient Position: Sitting)   Pulse 80   Temp 97.8 °F (36.6 °C) (Temporal)   Ht 5' 7.95\" (1.726 m)   Wt 130 lb (59 kg)   SpO2 100%   BMI 19.79 kg/m²       Wt Readings from Last 3 Encounters:   04/25/23 130 lb (59 kg)   01/24/23 130 lb 6 oz (59.1 kg)   10/21/22 128 lb 4 oz (58.2 kg)       Ht Readings from Last 3 Encounters:   04/25/23 5' 7.95\" (1.726 m)   01/24/23 5' 7.95\" (1.726 m)   10/21/22 5' 8.03\" (1.728 m)      Body mass index is 19.79 kg/m². Facility age limit for growth percentiles is 20 years. Facility age limit for growth percentiles is 20 years.    Facility age limit for growth percentiles is 20 years. General:  alert, cooperative, no distress, appears stated age   Oropharynx: normal    Eyes:  {normal    Ears:  {normal   Neck: {supple, no thyromegaly       Lung: No resp distress   Heart:  Pulse equal and normal   Abdomen: {nondistended   Extremities: extremities normal, atraumatic, no cyanosis or edema   Skin: Injection sites: normal   Pulses: 2+ and symmetric   Neuro: normal without focal findings  mental status, speech normal, alert and oriented x iii  MELISSA  reflexes normal and symmetric    Foot exam-has callus formation on the outside of the sole of the right foot. Lab Review  Lab Results   Component Value Date/Time    Hemoglobin A1c 10.4 (H) 10/14/2020 03:57 PM    Hemoglobin A1c 9.7 (H) 10/07/2018 07:41 PM    Hemoglobin A1c 8.5 (H) 02/24/2018 12:18 PM    Hemoglobin A1c (POC) 7.0 04/25/2023 10:48 AM    Hemoglobin A1c (POC) 7.2 01/24/2023 11:21 AM    Hemoglobin A1c (POC) 7.5 10/21/2022 10:45 AM      Lab Results   Component Value Date/Time    Hemoglobin A1c 10.4 (H) 10/14/2020 03:57 PM      Lab Results   Component Value Date/Time    Glucose 426 (H) 10/14/2020 03:57 PM        Lab Results   Component Value Date/Time    TSH 2.270 01/24/2023 01:03 PM     Lab Results   Component Value Date/Time    Cholesterol, total 115 01/24/2023 01:03 PM    HDL Cholesterol 54 01/24/2023 01:03 PM    LDL, calculated 50 01/24/2023 01:03 PM    LDL, calculated 60 01/10/2019 12:29 PM    VLDL, calculated 11 01/24/2023 01:03 PM    VLDL, calculated 8 01/10/2019 12:29 PM    Triglyceride 47 01/24/2023 01:03 PM     Component      Latest Ref Rng & Units 1/24/2023 1/24/2023 1/24/2023           1:03 PM  1:03 PM  1:03 PM   Creatinine, urine random      Not Estab. mg/dL   119.5   Microalbumin, urine      Not Estab. ug/mL   3.2   Microalbumin/Creat.  Ratio      0 - 29 mg/g creat   3   TSH      0.450 - 4.500 uIU/mL  2.270    t-Transglutaminase, IgA      0 - 3 U/mL <2       TSH      0.450 - 4.500 uIU/mL 2.700    t-Transglutaminase, IgA      0 - 3 U/mL <2       Reviewed more than 72 hours of data of CGMS     Blood sugar trends noted. Fluctuation in blood sugars: yes. Overnight blood sugar: 90 mg/dl to 160 mg/dl. Blood sugar during day time: trends: higher post meals,  Low blood sugars: occasional  Does not do insulin boluses and relies on increased basal done by the insulin pump as it is set for sleep mode for 24 hours per day. Does not like to go on exercise mode during day time at work and he fears the insulin bolus. Assessment:   Diabetes Mellitus type I, under better control. Hypoglycemia: occasional  Blood sugar trends: reviewed  Insulin pump Insertion sites: normal   Right clubfoot status postrepair has callus on the right sole of the feet also pain-referred to podiatrist- not made the appointment yet and we will do it soon. Referral also was given for ophthalmology for dilated eye exam, not done yet and will make it soon. Plan:   Treatment changes  Basal rates ( time: units/hour): Humalog through insulin pump:   12 midnight: 0.95,  1.45 am: 10 pm, 0.9 Total basal insulin per day: 28 units/day. Target blood sugar: 110 mg/dl, Carbohydrate correction breakfast: 1: 9, lunch: 1:9, dinner:1:9.     Insulin sensitivity factor/ glucose correction factor: breakfast: 1: 45 Lunch: 1: 45, dinner: 1:45. Lantus dose for basal in case of pump failure: 28 units. Insulin dose reviewed and confirmed with the caregiver. Time spent counseling patient 25 minutes  2. Education:  interpretation of lab results, blood sugar goals, complications of diabetes mellitus, hypoglycemia prevention and treatment, exercise, insulin adjustments, illness management, SMBG skills, nutrition, site rotation, use of insulin pen, carbohydrate counting and self-injection of insulin  Importance of parental supervision stressed.   Check urine ketones for:  Blood sugar levels above 350 mg/dl  Nausea and vomiting  Other illnesses, including fever, diarrhea, common cold. If ketones are negative, no change in your diabetes plan is needed  If ketones are trace or small:  Drink extra fluid (water or other calorie-free fluids)  Give insulin as you usually would base on your carbohydrate intake and your blood sugar level  Continue to check the urine ketones until they either go away, or until they increase to moderate or large  If ketones are moderate or large:  Call the diabetes team at [phone]- ask to speak to nurse and after hours/weekend/holidays ask for the pediatric endocrinologist on call. Target Hemoglobin A1C= 7.5 % reviewed. Flu vaccine recommended every year. Patient expressed understanding. 3.  Compliance at present is estimated to be good. Efforts to improve compliance (if necessary) will be directed at increased exercise. Long term complications, Sick day management, treatment of low blood sugars, use of glucagon for hypoglycemic seizures and unconsciousness reviewed. Change pump site every 3 days and rotation of insertion sites reviewed. Hemoglobin A1C reviewed. Correlation between A1C and long term complications like neuropathy, nephropathy and retinopathy reviewed. Acute complications like diabetes ketoacidosis and dehydration and electrolyte abnormalities discussed. Annual screen labs: due on: ordered (TSH, Lipid profile, Urine microalbumin, celiac screen). Annual eye exam: stressed. Need to review the blood sugars periodically if blood sugars are out of range as discussed in the clinic consistently. School forms: none. Prescriptions:yes. Total time with patient 40 minutes. Follow up in 3 months. Provided information on adult endocrinologist for transition of his care. Importance of bolusing before each meal and setting the pump for activity noted during his work time and blood sugar fluctuations was reviewed. Impact of blood sugar fluctuation on long-term complications was discussed.

## 2023-05-18 DIAGNOSIS — E10.9 TYPE 1 DIABETES MELLITUS WITHOUT COMPLICATIONS (HCC): ICD-10-CM

## 2023-05-18 RX ORDER — INSULIN LISPRO 100 [IU]/ML
INJECTION, SOLUTION INTRAVENOUS; SUBCUTANEOUS
Qty: 90 ML | Refills: 0 | OUTPATIENT
Start: 2023-05-18

## 2023-08-07 RX ORDER — INSULIN LISPRO 100 [IU]/ML
INJECTION, SOLUTION INTRAVENOUS; SUBCUTANEOUS
Qty: 30 ML | Refills: 0 | Status: SHIPPED | OUTPATIENT
Start: 2023-08-07

## 2023-08-07 NOTE — TELEPHONE ENCOUNTER
Patient missed apt on April and July - he just got a new apt on 8/15/23 but he needs refill on his     insulin lispro (HUMALOG) 100 UNIT/ML SOLN injection vial       [facility] [id number]

## 2023-08-15 ENCOUNTER — OFFICE VISIT (OUTPATIENT)
Age: 23
End: 2023-08-15
Payer: COMMERCIAL

## 2023-08-15 VITALS
SYSTOLIC BLOOD PRESSURE: 135 MMHG | HEART RATE: 91 BPM | DIASTOLIC BLOOD PRESSURE: 80 MMHG | HEIGHT: 68 IN | BODY MASS INDEX: 19.97 KG/M2 | WEIGHT: 131.8 LBS | OXYGEN SATURATION: 97 % | RESPIRATION RATE: 19 BRPM

## 2023-08-15 DIAGNOSIS — E10.9 TYPE 1 DIABETES MELLITUS WITHOUT COMPLICATIONS (HCC): Primary | ICD-10-CM

## 2023-08-15 LAB — HBA1C MFR BLD: 7.2 %

## 2023-08-15 PROCEDURE — 95251 CONT GLUC MNTR ANALYSIS I&R: CPT | Performed by: PEDIATRICS

## 2023-08-15 PROCEDURE — G8420 CALC BMI NORM PARAMETERS: HCPCS | Performed by: PEDIATRICS

## 2023-08-15 PROCEDURE — 2022F DILAT RTA XM EVC RTNOPTHY: CPT | Performed by: PEDIATRICS

## 2023-08-15 PROCEDURE — 83036 HEMOGLOBIN GLYCOSYLATED A1C: CPT | Performed by: PEDIATRICS

## 2023-08-15 PROCEDURE — 99215 OFFICE O/P EST HI 40 MIN: CPT | Performed by: PEDIATRICS

## 2023-08-15 PROCEDURE — 3046F HEMOGLOBIN A1C LEVEL >9.0%: CPT | Performed by: PEDIATRICS

## 2023-08-15 PROCEDURE — G8427 DOCREV CUR MEDS BY ELIG CLIN: HCPCS | Performed by: PEDIATRICS

## 2023-08-15 PROCEDURE — 4004F PT TOBACCO SCREEN RCVD TLK: CPT | Performed by: PEDIATRICS

## 2023-08-15 RX ORDER — INSULIN LISPRO 100 [IU]/ML
INJECTION, SOLUTION INTRAVENOUS; SUBCUTANEOUS
Qty: 30 ML | Refills: 0 | Status: SHIPPED | OUTPATIENT
Start: 2023-08-15

## 2023-08-15 RX ORDER — INSULIN GLARGINE 100 [IU]/ML
INJECTION, SOLUTION SUBCUTANEOUS
Qty: 5 ADJUSTABLE DOSE PRE-FILLED PEN SYRINGE | Refills: 3 | Status: SHIPPED | OUTPATIENT
Start: 2023-08-15

## 2023-08-15 RX ORDER — GLUCAGON 3 MG/1
POWDER NASAL
Qty: 1 EACH | Refills: 0 | Status: SHIPPED | OUTPATIENT
Start: 2023-08-15

## 2023-08-15 RX ORDER — PERPHENAZINE 16 MG/1
TABLET, FILM COATED ORAL
Qty: 200 EACH | Refills: 3 | Status: SHIPPED | OUTPATIENT
Start: 2023-08-15

## 2023-08-15 ASSESSMENT — PATIENT HEALTH QUESTIONNAIRE - PHQ9
SUM OF ALL RESPONSES TO PHQ QUESTIONS 1-9: 0
SUM OF ALL RESPONSES TO PHQ QUESTIONS 1-9: 0
SUM OF ALL RESPONSES TO PHQ9 QUESTIONS 1 & 2: 0
SUM OF ALL RESPONSES TO PHQ QUESTIONS 1-9: 0
1. LITTLE INTEREST OR PLEASURE IN DOING THINGS: 0
SUM OF ALL RESPONSES TO PHQ QUESTIONS 1-9: 0
2. FEELING DOWN, DEPRESSED OR HOPELESS: 0

## 2023-08-15 NOTE — PROGRESS NOTES
AMINTA Encounter with Braden Friend for follow up of Type 1 Diabetes. 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 yes Expiration date? New insurance so ordered   [x] Ketones - Expiration date?  Not needed      Recent Results (from the past 12 hour(s))   AMB POC HEMOGLOBIN A1C    Collection Time: 08/15/23 11:06 AM   Result Value Ref Range    Hemoglobin A1C, POC 7.2 %         Hemoglobin A1C, POC   Date Value Ref Range Status   08/15/2023 7.2 % Final   04/25/2023 7.0 % Final   01/24/2023 7.2 % Final     Hemoglobin A1C   Date Value Ref Range Status   10/14/2020 10.4 (H) 4.0 - 5.6 % Final     Comment:     NEW METHOD  PLEASE NOTE NEW REFERENCE RANGE  (NOTE)  HbA1C Interpretive Ranges  <5.7              Normal  5.7 - 6.4         Consider Prediabetes  >6.5              Consider Diabetes          Lab Results   Component Value Date/Time    GLUCOSE 426 10/14/2020 03:57 PM           Jacob Mendoza RD, [facility]

## 2023-08-15 NOTE — PROGRESS NOTES
Identified patient with two patient identifiers- name and . Reviewed record in preparation for visit and have obtained necessary documentation. Chief Complaint   Patient presents with    Diabetes      Patient stated Humalog was $800. [facility] and info was provided to patient. Patient will need to provide new/ in-network pharmacy. Patient will call insurance today.

## 2023-08-15 NOTE — PROGRESS NOTES
[address] [address]  [phone]        Cc: Type 1 diabetes          On insulin pump: Tandem control IQ          Fluctuation of blood sugars          Low blood sugars: occasional          Other: CGM S          Fear of low blood sugars. [facility]:  Joe Miller is a 26 years and 7 months old male who presents for follow up evaluation of Type 1 diabetes mellitus. Wearing CGMS and he still has fear of low blood sugars and there is no change since last few visits. He does not bolus for meals. He is on tandem control IQ insulin pump and does not give any bolus for his meals. He has changed settings to sleep mode for 24 hours a day and would like his basal rates to go up and started getting boluses because of the fear of low blood sugar. Blood sugar review shows that the target range between 70-1 80 he is around 40%. He does have fluctuation of blood sugars. He works 40 hours/week at [employer] which involves walking almost close to 5 miles per day and has to lift more weights. He is not interested in putting the activity mode for the pump. Checking 4-6 blood sugars per day. His clinical course has been stable. Insulin dosage review with Bjorn's caregiver suggested compliance most of the time. Associated symptoms of hyperglycemia have included : none . Associated symptoms of hypoglycemia have included: jitteriness, sweating and hunger. Treatment of low blood sugar: appropriate. Offered to see counseling and done before and not interested in now. He is currently taking: Humalog through : insulin pump: Tandem insulin pump was reviewed:  .change of insulin insertion sites: every 3 days. Any problems with insertion sites: none. Compliance with blood gucose monitoring: good. He does 3 meals ad 1-2 snacks, he has low carb meals 25-30 grams per meal, carb for snacks are arund 15 grams.  He is working full time at [employer] between 6 am to 3 pm. The patient  does

## 2023-08-15 NOTE — MR AVS SNAPSHOT
Visit Information Date & Time Provider Department Dept. Phone Encounter #  
 12/15/2017  1:20 PM Wiliam Nguyen MD [facility] [phone] Your Appointments 3/16/2018  1:20 PM  
ESTABLISHED PATIENT with Wiliam Nguyen MD  
[facility] - [facility]) Appt Note: 3 month f/u - T1DM  
 [address] [phone] [id number] [phone] [address] [place] [address] Upcoming Health Maintenance Date Due Hepatitis B Peds Age 0-18 (1 of 3 - Primary Series) 2001 IPV Peds Age 0-24 (1 of 4 - All-IPV Series) 2001 Hepatitis A Peds Age 1-18 (1 of 2 - Standard Series) 2/21/2002 MMR Peds Age 1-18 (1 of 2) 2/21/2002 DTaP/Tdap/Td series (1 - Tdap) 2/21/2008 EYE EXAM RETINAL OR DILATED Q1 2/21/2011 HPV AGE 9Y-26Y (1 of 3 - Male 3 Dose Series) 2/21/2012 Varicella Peds Age 1-18 (1 of 2 - 2 Dose Adolescent Series) 2/21/2014 LIPID PANEL Q1 7/17/2016 MCV through Age 25 (1 of 1) 2/21/2017 Influenza Age 5 to Adult 8/1/2017 MICROALBUMIN Q1 3/3/2018 HEMOGLOBIN A1C Q6M 3/7/2018 FOOT EXAM Q1 9/7/2018 Allergies as of 12/15/2017  Review Complete On: 12/15/2017 By: Dane Cook LPN No Known Allergies Current Immunizations  Never Reviewed No immunizations on file. Not reviewed this visit You Were Diagnosed With   
  
 Codes Comments Uncontrolled type 1 diabetes mellitus with hyperglycemia (HCC)    -  Primary ICD-10-CM: E10.65 ICD-9-CM: 250.83, 790.29 Vitals BP Pulse Temp Height(growth percentile) 139/82 (98 %/ 90 %)* (BP 1 Location: Right arm, BP Patient Position: Sitting) 93 98.1 °F (36.7 °C) (Oral) 5' 7.84\" (1.723 m) (35 %, Z= -0.37) Weight(growth percentile) SpO2 BMI Smoking Status 127 lb 6.4 oz (57.8 kg) (26 %, Z= -0.64) 99% 19.47 kg/m2 (26 %, Z= -0.65) Never Smoker *BP percentiles are based on NHBPEP's 4th Report Growth percentiles are based on [facility] 2-20 Years data. Vitals History BMI and BSA Data Body Mass Index Body Surface Area  
 19.47 kg/m 2 1.66 m 2 Preferred Pharmacy Pharmacy Name Phone [id number] [place] [phone] Your Updated Medication List  
  
   
This list is accurate as of: 12/15/17  2:15 PM.  Always use your most recent med list.  
  
  
  
  
 ergocalciferol 50,000 unit capsule Commonly known as:  ERGOCALCIFEROL  
1 capsule once a week (every Sunday) for 6 weeks  
  
 glucagon 1 mg injection Commonly known as:  GLUCAGON EMERGENCY KIT (HUMAN) Inject 1 mg into thigh muscle for severe hypoglycemia and unconsciousness. Indications: one for school one for home  
  
 glucose blood VI test strips strip Commonly known as:  CONTOUR NEXT STRIPS  
TEST UP TO 6 TIMES DAILY OR AS DIRECTED BY PHYSICIAN  
  
 insulin aspart 100 unit/mL injection Commonly known as:  Arnold Fordeing INJECT UP  UNITS DAILY AS DIRECTED BY MD VIA PUMP  
  
 insulin glargine 100 unit/mL injection Commonly known as:  LANTUS The patient is to use up to 50 unit as needed We Performed the Following AMB POC HEMOGLOBIN A1C [58198 CPT(R)] Patient Instructions SICK DAY GUIDELINES When you have diabetes, not feeling well affects your eating patterns and how your blood sugar reacts to your usual dose of insulin or diabetes pills. When you are sick, your body will release hormones that work to help your body fight against your illness, but they will also make your blood sugar levels rise. This means that your diabetes will be more difficult to control when you are sick.   
Sickness can include: a cold, flu-like symptoms such as vomiting, diarrhea, sore throat,  
 
 What happens when I am sick? Illness puts your body in a state of stress. When you dont feel well, your body produces stress hormones. These hormones work to help your body fight the infection or injury that is making you sick. They send a signal to your liver to release sugar to help in the fight. This makes your blood sugar rise. In people without diabetes, when the liver releases sugar to help the body fight against the illness, the pancreas also makes extra insulin. This allows the body to use the sugar for energy and the blood sugar remains within a normal range. However, if you have diabetes, your body cannot make the extra insulin needed and your blood sugar will go up. The stress hormones also work against insulin. Together, the sugar produced from your liver and the stress hormones act to make your blood sugar rise. You can see how illness can cause blood sugar levels to go out of control. If not treated quickly, high blood sugar levels caused by illness can lead to more serious problems like Diabetes Ketoacidois (DKA) or Hyperosmolar Hyperglycemic Non-ketotic Syndrome (HHNK). SICK DAY RULES Special rules have been developed to help you manage your diabetes when you are sick. Following these guidelines will help you manage your diabetes and recover from your illness without complications. 1. Drink Fluids  even if you are able to eat food. You need to replace the fluids that your body loses due to high blood sugars, fever, vomiting and diarrhea. When you are sick or not feeling well you may drink less than when you are well. You need to drink fluids to prevent dehydration (loss of water). It is recommended that you must drink fluids every hour that you are awake. All fluids should be caffeine-free because caffeine acts like a water pill or diuretic. Drinks that contain caffeine will make the dehydration worse. Here is what to do: 
· Place an 8-oz. glass next to your bed or chair. · Every hour  sip 8 oz. of sugar-free, caffeine-free drinks. The best fluids to drink when you are sick are: water, sugar-free soft drinks, green tea or black tea without milk · Every third hour  sip 8 oz. Of one of the following types of soup (to provide sodium and other minerals that your body needs): broth, bouillon (clear, thin soup), canned clear soup · If your blood sugar is running low, you may need to drink some fluids that have carbohydrates or sugar in them. (See \"foods for sick day use\" below). Note: When you are sick, you may sometimes find if you eat less and take your medication, your blood sugar is only a little higher than usual. This is because your body is turning to fat to meet its needs for energy. This can put you at risk for developing DKA. Therefore it is important to take your medication and drink fluids that contain carbohydrates to replace the amount of carbohydrate in the food that you would normally be eating. 15 grams of carbohydrates every hours while awake. 2. Check your blood sugar. · Your blood sugar level can rise before you even know you are sick. Check your blood sugar every 2 to 4 hours when you are sick  especially if you are vomiting. · Write the results in your logbook. · Report any high blood sugar results (>350 mg/dL) by telephone to your doctor or diabetes care team member as soon as possible. · If your blood sugar is high every time you check it, you may need to change your diabetes medication or insulin dose. If you are unsure if more medicine is needed, call your doctor. 3. Never skip your insulin injection or medication when you are sick! Many people with diabetes forget that their blood sugar levels go up when they are sick. They think, since they are not eating, there is no sugar in their blood and then there is no need to take insulin or other diabetes medicine.  Because they are not eating or eating less than usual, they do not take their medication or insulin. In fact, you may need more medication or insulin when you are sick because illness makes your blood sugar rise. ALWAYS TAKE YOUR DIABETES MEDICINE! If you are unsure if more medicine is needed, you should call your doctor or diabetes educator for advice. If you are taking insulin: · The full dose of daily insulin is usually required. · Take the usual dose of intermediate or long acting insulin (NPH, Lantus® or Levemir®). · You may need to take frequent doses of short-acting or rapid-acting insulin if your blood sugar is high, or ketones are present. · If you use an insulin pump, continue the usual basal dose. Contact your doctor if the basal rate does not seem to be keeping your blood sugars in range. If you are taking diabetes pills: 
· You should take your pills as usual. 
· If you think you may have vomited your pills, do not take any more pills and report this event to your doctor or diabetes educator. ·  Sometimes your doctor may want you to take insulin for a short period of time instead of pills until your blood sugar comes down and you start to feel better. 4. Check your blood or urine for ketones whenever you are sick  or if your blood sugar stays higher than 350 mg/dl. (ketones could develop as low as 240) The presence of ketones in the urine becomes a concern for people with diabetes if they have high blood sugar. If you have diabetes, ketones in your urine means that your body is in trouble  you are burning fat for energy instead of sugar because there is not enough insulin available. If you measure ketones in your blood above 0.6mmol/L or moderate to large ketones in your urine when you are sick, report this to your doctor or diabetes educator right away  especially if you have type 1 diabetes. Blood or urine ketone levels should be tested every 4 hours until readings are negative. 5. Take your temperature  fever can cause you to become dehydrated. A fever may be a sign of infection. What do I do if I don't feel like eating? Sometimes, you just dont feel like eating when you are sick. Even dry toast seems like too much. When you have no appetite or cant face food, try drinking fluids or eating soft foods that contain carbohydrates or sugar instead of solid foods.  Try to replace the carbohydrate portion of a meal with an equal amount of carbohydrate from semisolid foods or fluids. (Drinks that contain sugar or carbohydrates rather than sugar-free foods will give you the energy you need to fight fever and infection.) If you dont know your carbohydrate goal or can only take small amounts of food at a time, choose three or four servings from the list below every three to four hours. For Sick Day Use  Foods containing 15 grams of carbohydrate: 
 1/2 cup (4 oz) apple juice  1/2 cup (4 oz) sweetened, caffeine-free soft drink  1 Popsicle stick  1 cup (8 oz) sports drink  1/2 cup (4 oz) sweetened ice cream 
 1/4 cup (2 oz) sherbet  1/4 cup (2 oz) sweetened pudding  1/2 cup (4 oz) sweetened gelatin/Jell-O®  1 cup (8 oz) artificially sweetened or plain yogurt (not frozen)  1 cup (8 oz) milk  1 cup (8 oz) soup  1 slice of toast 
 5-6 crackers What if I can only keep fluids down? If you cant eat, you will need to alternate fluids that have calories  with no-calorie/no-carbohydrate drinks all day, as follows: · First hour  drink 4-8 oz. of regular soda · Next hour or two  drink 4-8 oz. of water or seltzer This will let you gradually add the calories and fluids that your body needs through the day. As soon as your appetite returns, you can slowly introduce your usual foods.  If you've been sick to your stomach, start by drinking clear liquids (drinks you can see through, such as broth, tea, Jell-O®, regular soft drinks and Popsicle® sticks. When you can keep these down, move on to full liquids (orange or tomato juice, ice cream and soup) and then to soft foods (oatmeal, toast, plain cooked vegetables, applesauce, rice, noodles and crackers). Remember to eat the same amount of carbohydrate as usual.  
If you are not sure what foods to eat when sick, call your doctor or diabetes educator. What if Im too sick to follow all the rules? Taking care of yourself when you are sick sounds simple: drink fluids, check your blood sugar, check your blood or urine for ketones and take your medication. The problem is that when you are sick, everything is an effort and many people feel too weak to do everything they should. Try asking a neighbor or family member for help and keep these Sick Day Rules handy for their use. Sick Day Supplies No one feels like running out to the store when they are sick. Be prepared for Sick Days by having a few supplies in the house including:  1 box sugar-free gelatin or Jell-O  1 box sweetened gelatin or Jell-O  1 box of instant broth  1 bottle of apple juice  1 box sweetened pudding  1 bottle sports drink  Urine ketone testing strips (check the expiration date)  Thermometer Could prescription medicine raise or lower my blood sugar? Some medications are prescribed because they are the best choice to treat your illness. However, they may make your blood sugar go up or down. The best thing to do is to ask your doctor or diabetes educator. Phone Alert! Call your health care provider immediately or have someone call for you, if you: 
· Have trouble breathing. · Vomit more than once and are unable to keep food down more than 6 hours (repeated vomiting might require a visit to the emergency room for IV fluids). · Have diarrhea more than 5 times or for longer than 6 hours. · Lose 5 pounds or more during the time that you are sick. · Have temperature over 101 degrees F. 
· Have 2 or more blood glucose readings in a row that are greater than 240 mg/dl or lower than 60 mg/dl. · Have moderate or large ketones in your urine or blood ketones above 0.6 mmol/L. · Have a fruity odor to your breath. · Have any questions about what you should do. · If you feel sleepy or cant think clearly  have someone call your doctor or diabetes educator or take you to an emergency room. [facility] [address] [place] [address], [address] Office Phone: [phone] Office Fax: [phone] Resource: Information included on this handout was referenced from from [facility]. [facility]! Dear Parent or Guardian, Thank you for requesting a ZikBit account for your child. With ZikBit, you can view your childs hospital or ER discharge instructions, current allergies, immunizations and much more. In order to access your childs information, we require a signed consent on file. Please see the [facility] department or call [phone] for instructions on completing a ZikBit Proxy request.   
Additional Information If you have questions, please visit the Frequently Asked Questions section of the Zik[employer] website at [email or web address] Remember, ZikBit is NOT to be used for urgent needs. For medical emergencies, dial 911. Now available from your iPhone and Android! Please provide this summary of care documentation to your next provider. Your primary care clinician is listed as Elveria Goodpasture. If you have any questions after today's visit, please call [phone]. Yes

## 2023-09-11 RX ORDER — INSULIN LISPRO 100 [IU]/ML
INJECTION, SOLUTION INTRAVENOUS; SUBCUTANEOUS
Qty: 30 ML | Refills: 0 | Status: SHIPPED | OUTPATIENT
Start: 2023-09-11

## 2023-10-11 RX ORDER — INSULIN LISPRO 100 [IU]/ML
INJECTION, SOLUTION INTRAVENOUS; SUBCUTANEOUS
Qty: 30 ML | Refills: 0 | Status: SHIPPED | OUTPATIENT
Start: 2023-10-11 | End: 2023-10-12

## 2023-10-12 RX ORDER — INSULIN LISPRO 100 [IU]/ML
INJECTION, SOLUTION INTRAVENOUS; SUBCUTANEOUS
Qty: 30 ML | Refills: 0 | Status: SHIPPED | OUTPATIENT
Start: 2023-10-12

## 2023-11-15 ENCOUNTER — OFFICE VISIT (OUTPATIENT)
Age: 23
End: 2023-11-15

## 2023-11-15 VITALS
RESPIRATION RATE: 20 BRPM | HEIGHT: 68 IN | HEART RATE: 102 BPM | SYSTOLIC BLOOD PRESSURE: 129 MMHG | BODY MASS INDEX: 20.22 KG/M2 | DIASTOLIC BLOOD PRESSURE: 86 MMHG | OXYGEN SATURATION: 97 % | WEIGHT: 133.4 LBS

## 2023-11-15 DIAGNOSIS — E10.9 TYPE 1 DIABETES MELLITUS WITHOUT COMPLICATIONS (HCC): Primary | ICD-10-CM

## 2023-11-15 LAB — HBA1C MFR BLD: 7 %

## 2023-11-15 ASSESSMENT — PATIENT HEALTH QUESTIONNAIRE - PHQ9
SUM OF ALL RESPONSES TO PHQ QUESTIONS 1-9: 0
SUM OF ALL RESPONSES TO PHQ9 QUESTIONS 1 & 2: 0
1. LITTLE INTEREST OR PLEASURE IN DOING THINGS: 0
2. FEELING DOWN, DEPRESSED OR HOPELESS: 0

## 2023-11-15 NOTE — PROGRESS NOTES
Identified patient with two patient identifiers- name and . Reviewed record in preparation for visit and have obtained necessary documentation.     Chief Complaint   Patient presents with    Diabetes
or until they increase to moderate or large  If ketones are moderate or large:  Call the diabetes team at [phone]- ask to speak to nurse and after hours/weekend/holidays ask for the pediatric endocrinologist on call. Target Hemoglobin A1C= 7.5 % reviewed. Flu vaccine recommended every year. Patient expressed understanding. 3.  Compliance at present is estimated to be good. Efforts to improve compliance (if necessary) will be directed at increased exercise. Long term complications, Sick day management, treatment of low blood sugars, use of glucagon for hypoglycemic seizures and unconsciousness reviewed. Change pump site every 3 days and rotation of insertion sites reviewed. Hemoglobin A1C reviewed. Correlation between A1C and long term complications like neuropathy, nephropathy and retinopathy reviewed. Acute complications like diabetes ketoacidosis and dehydration and electrolyte abnormalities discussed. Annual screen labs: due on: ordered (TSH, Lipid profile, Urine microalbumin, celiac screen). Annual eye exam: stressed. Need to review the blood sugars periodically if blood sugars are out of range as discussed in the clinic consistently. School forms: none. Prescriptions:yes. Total time with patient 40 minutes. Provided information on adult endocrinologist for transition of his care, has appointment with adult endocrinologist in Jan 2024. Importance of bolusing before each meal and setting the pump for activity noted during his work time and blood sugar fluctuations was reviewed. Impact of blood sugar fluctuation on long-term complications was discussed.

## 2023-12-13 ENCOUNTER — PATIENT MESSAGE (OUTPATIENT)
Age: 23
End: 2023-12-13

## 2023-12-14 NOTE — TELEPHONE ENCOUNTER
From: Maia Carrington  To: Dr. Jarrell Dias: 12/13/2023 6:40 PM EST  Subject: Coverage dropping for humalog    I have been notified by my insurance provider that humalog will no longer be covered by them before my visit to the adult endocrinologist. What are my options for insulin covered by my plan? They advised me to reach out to my prescriber about this issue.

## 2024-01-15 RX ORDER — INSULIN LISPRO 100 [IU]/ML
INJECTION, SOLUTION INTRAVENOUS; SUBCUTANEOUS
Qty: 30 ML | Refills: 0 | Status: SHIPPED | OUTPATIENT
Start: 2024-01-15

## 2024-01-23 ENCOUNTER — OFFICE VISIT (OUTPATIENT)
Age: 24
End: 2024-01-23
Payer: COMMERCIAL

## 2024-01-23 VITALS
HEIGHT: 68 IN | BODY MASS INDEX: 20.25 KG/M2 | DIASTOLIC BLOOD PRESSURE: 80 MMHG | HEART RATE: 80 BPM | SYSTOLIC BLOOD PRESSURE: 128 MMHG | WEIGHT: 133.6 LBS

## 2024-01-23 DIAGNOSIS — E10.9 TYPE 1 DIABETES MELLITUS WITHOUT COMPLICATION (HCC): Primary | ICD-10-CM

## 2024-01-23 PROCEDURE — 1036F TOBACCO NON-USER: CPT | Performed by: INTERNAL MEDICINE

## 2024-01-23 PROCEDURE — 2022F DILAT RTA XM EVC RTNOPTHY: CPT | Performed by: INTERNAL MEDICINE

## 2024-01-23 PROCEDURE — 3046F HEMOGLOBIN A1C LEVEL >9.0%: CPT | Performed by: INTERNAL MEDICINE

## 2024-01-23 PROCEDURE — G8484 FLU IMMUNIZE NO ADMIN: HCPCS | Performed by: INTERNAL MEDICINE

## 2024-01-23 PROCEDURE — 99204 OFFICE O/P NEW MOD 45 MIN: CPT | Performed by: INTERNAL MEDICINE

## 2024-01-23 PROCEDURE — G8427 DOCREV CUR MEDS BY ELIG CLIN: HCPCS | Performed by: INTERNAL MEDICINE

## 2024-01-23 PROCEDURE — G8420 CALC BMI NORM PARAMETERS: HCPCS | Performed by: INTERNAL MEDICINE

## 2024-01-23 RX ORDER — PROCHLORPERAZINE 25 MG/1
SUPPOSITORY RECTAL
COMMUNITY

## 2024-01-23 RX ORDER — MULTIVITAMIN
TABLET ORAL DAILY
COMMUNITY

## 2024-01-23 NOTE — PROGRESS NOTES
Chief Complaint   Patient presents with    New Patient     No PCP, pharmacy confirmed     Diabetes     History of Present Illness: Bjorn Faith is a 23 y.o. male who is a new patient for evaluation of diabetes.  Transferring care from Dr. Cheema and last saw him in 11/23 and his A1c at that time was 7% and had been up to 8.9% in 1/22 and has worked over the past 2 years to get this back down.  Did have some hypoglycemic seizures in the past prior to having access to Dexcom and was tending to run his sugars on the higher side but now feels more comfortable using the tandem pump with control IQ and has not had any seizures in the past 4-5 years.    Current settings are as follows:  - basal: 12a: 0.98, 8a: 1.25, 10p: 0.9  - Carb ratio: 12a: 8, 8a: 9. 10p: 8  - sensitivity: 12a: 50, 8a: 45, 10p: 50  - target: 110  - active insulin time: 5 hours.    Fasting sugars are in the  range and during the day tends to run the sleep setting to allow him to be less likely to go low while working at [employer].  Is comfortable counting carbs.  Changes sites every 3 days.  Finds that he doesn't tend to spike easily after meals unless he undercounts the carbs.      Exercise consists of being active on the job at [employer] and does more walking when the weather is warmer and hopes to get a [employer] membership.  No history of vascular disease.  No history of retinopathy, neuropathy, or nephropathy.  Last eye exam was a few year ago. He requested to do labs before his next visit rather than today as prefers to have his partner with him to drive after the lab draw.    Past Medical History:   Diagnosis Date    Diabetes (HCC) 2006    type 1    Seizures (HCC)     from low blood sugars--none since 2018     Past Surgical History:   Procedure Laterality Date    ORTHOPEDIC SURGERY      club foot RIGHT     Current Outpatient Medications   Medication Sig    Continuous Blood Gluc Sensor (DEXCOM G6 SENSOR) MISC by Does not apply route    Multiple

## 2024-01-23 NOTE — PATIENT INSTRUCTIONS
1) Double check with your insurance to see if they prefer novolog or fiasp or generic lispro in place of humalog and I can take care of this in the future.    2) Contact [employer] to let them know I am not caring for you so I will receive the pump supply and dexcom order forms in the future.    3) The address is [address] [place] [address] [address] [address] in the [facility] ([facility])     4) Let me know when you need any refills and I'll take care of them.    5) Upload any FMLA forms to [employer].  If you are having trouble doing this, my e-mail is [email or web address].

## 2024-02-13 RX ORDER — INSULIN LISPRO 100 [IU]/ML
INJECTION, SOLUTION INTRAVENOUS; SUBCUTANEOUS
Qty: 30 ML | Refills: 0 | OUTPATIENT
Start: 2024-02-13

## 2024-03-07 ENCOUNTER — TELEPHONE (OUTPATIENT)
Age: 24
End: 2024-03-07

## 2024-03-07 NOTE — TELEPHONE ENCOUNTER
Please fax his FMLA form to [employer] disability, leaves and accommodation service [place] at [phone].      Also please e-mail patient a copy per his request and let him know over mychart when this has been done.  Thanks.

## 2024-03-08 NOTE — TELEPHONE ENCOUNTER
[facility] paperwork faxed to [facility] disability, leaves and accommodation service [place] at [phone], confirmation received. FMLA form and confirmation emailed to pt at [email or web address] Pt notified via [email or web address].

## 2024-05-20 RX ORDER — INSULIN LISPRO 100 [IU]/ML
INJECTION, SOLUTION INTRAVENOUS; SUBCUTANEOUS
Qty: 30 ML | Refills: 0 | OUTPATIENT
Start: 2024-05-20

## 2024-05-23 DIAGNOSIS — E10.9 TYPE 1 DIABETES MELLITUS WITHOUT COMPLICATION (HCC): Primary | ICD-10-CM

## 2024-05-23 RX ORDER — INSULIN LISPRO 100 [IU]/ML
INJECTION, SOLUTION INTRAVENOUS; SUBCUTANEOUS
Qty: 30 ML | Refills: 11 | Status: SHIPPED | OUTPATIENT
Start: 2024-05-23

## 2024-05-23 RX ORDER — INSULIN LISPRO 100 [IU]/ML
INJECTION, SOLUTION INTRAVENOUS; SUBCUTANEOUS
Qty: 30 ML | Refills: 11 | Status: SHIPPED | OUTPATIENT
Start: 2024-05-23 | End: 2024-05-23 | Stop reason: SDUPTHER

## 2024-05-30 LAB
ALBUMIN SERPL-MCNC: 4.8 G/DL (ref 4.3–5.2)
ALBUMIN/CREAT UR: 4 MG/G CREAT (ref 0–29)
ALBUMIN/GLOB SERPL: 2 {RATIO} (ref 1.2–2.2)
ALP SERPL-CCNC: 77 IU/L (ref 44–121)
ALT SERPL-CCNC: 24 IU/L (ref 0–44)
AST SERPL-CCNC: 25 IU/L (ref 0–40)
BILIRUB SERPL-MCNC: 0.7 MG/DL (ref 0–1.2)
BUN SERPL-MCNC: 12 MG/DL (ref 6–20)
BUN/CREAT SERPL: 11 (ref 9–20)
CALCIUM SERPL-MCNC: 10.3 MG/DL (ref 8.7–10.2)
CHLORIDE SERPL-SCNC: 97 MMOL/L (ref 96–106)
CHOLEST SERPL-MCNC: 162 MG/DL (ref 100–199)
CO2 SERPL-SCNC: 25 MMOL/L (ref 20–29)
CREAT SERPL-MCNC: 1.1 MG/DL (ref 0.76–1.27)
CREAT UR-MCNC: 82.7 MG/DL
EGFRCR SERPLBLD CKD-EPI 2021: 97 ML/MIN/1.73
GLOBULIN SER CALC-MCNC: 2.4 G/DL (ref 1.5–4.5)
GLUCOSE SERPL-MCNC: 133 MG/DL (ref 70–99)
HBA1C MFR BLD: 7.5 % (ref 4.8–5.6)
HDLC SERPL-MCNC: 57 MG/DL
IMP & REVIEW OF LAB RESULTS: NORMAL
LDLC SERPL CALC-MCNC: 93 MG/DL (ref 0–99)
Lab: NORMAL
MICROALBUMIN UR-MCNC: 3.4 UG/ML
POTASSIUM SERPL-SCNC: 4.8 MMOL/L (ref 3.5–5.2)
PROT SERPL-MCNC: 7.2 G/DL (ref 6–8.5)
SODIUM SERPL-SCNC: 136 MMOL/L (ref 134–144)
TRIGL SERPL-MCNC: 62 MG/DL (ref 0–149)
TSH SERPL DL<=0.005 MIU/L-ACNC: 2.1 UIU/ML (ref 0.45–4.5)
VLDLC SERPL CALC-MCNC: 12 MG/DL (ref 5–40)

## 2024-08-14 ENCOUNTER — OFFICE VISIT (OUTPATIENT)
Age: 24
End: 2024-08-14
Payer: COMMERCIAL

## 2024-08-14 VITALS
DIASTOLIC BLOOD PRESSURE: 78 MMHG | BODY MASS INDEX: 21.4 KG/M2 | SYSTOLIC BLOOD PRESSURE: 137 MMHG | HEART RATE: 80 BPM | HEIGHT: 68 IN | WEIGHT: 141.2 LBS

## 2024-08-14 DIAGNOSIS — E83.52 HYPERCALCEMIA: ICD-10-CM

## 2024-08-14 DIAGNOSIS — E10.9 TYPE 1 DIABETES MELLITUS WITHOUT COMPLICATION (HCC): Primary | ICD-10-CM

## 2024-08-14 LAB — HBA1C MFR BLD: 7.2 %

## 2024-08-14 PROCEDURE — 99214 OFFICE O/P EST MOD 30 MIN: CPT | Performed by: INTERNAL MEDICINE

## 2024-08-14 PROCEDURE — 83036 HEMOGLOBIN GLYCOSYLATED A1C: CPT | Performed by: INTERNAL MEDICINE

## 2024-08-14 PROCEDURE — 3051F HG A1C>EQUAL 7.0%<8.0%: CPT | Performed by: INTERNAL MEDICINE

## 2024-08-14 NOTE — PATIENT INSTRUCTIONS
1) Your calcium was slightly high at 10.3 but it's possible you could have taken some TUMS prior to the lab draw so next time be sure not to have any TUMS within the 2 weeks prior to the lab draw.  For completeness, I will check a PTH (parathyroid hormone) level.    2) BUN and creatinine are markers of kidney function.  Your values are normal.    3) ALT and AST are markers of liver function.  Your values are normal.    4) TSH is a thyroid test.  Your level is normal so you don't have any problem with your thyroid function at this time that needs further evaluation or treatment.     5) Your cholesterol was normal.    6) Microalbumin/creatinine ratio is a marker of the amount of protein in your urine.  Goal is less than 30.  Your value is normal. This indicates that your kidneys are not being affected by your diabetes and/or blood pressure.    7) Your Hemoglobin A1c (3 month test of blood sugar) was 7.2% down from 7.5%    8) Dr. Tu Almonte [phone] for podiatry.

## 2024-08-14 NOTE — PROGRESS NOTES
Chief Complaint   Patient presents with    Diabetes     PCP and pharmacy confirmed     History of Present Illness: Bjorn Faith is a 24 y.o. male here for follow up of diabetes.  Weight up 8 lbs since last visit in 1/24.  Has not made any changes to his pump settings and overall feels his settings are good and gets fasting sugars in the  range and mostly is able to stay under 150 during the day.  Still uses the sleep mode mostly during work at [employer] and this helps prevent low sugars.  One day he forgot to do this and the pump gave a correction bolus and caused a low while at work.  May have taken some TUMS prior to the lab draw.  No history of kidney stones.  Has had issues with pain and numbness in his club foot on the top of the right food and gave him Dr. Almonte's contact info.    Current settings are as follows:  - basal: 12a: 0.98, 8a: 1.25, 10p: 0.9  - Carb ratio: 12a: 8, 8a: 9. 10p: 8  - sensitivity: 12a: 50, 8a: 45, 10p: 50  - target: 110  - active insulin time: 5 hours.      Current Outpatient Medications   Medication Sig    insulin lispro (HUMALOG) 100 UNIT/ML SOLN injection vial INJECT UP  UNITS DAILY VIA INSULIN PUMP    Continuous Blood Gluc Sensor (DEXCOM G6 SENSOR) MISC by Does not apply route    Multiple Vitamin (MULTIVITAMIN) TABS Take by mouth daily    Glucagon (BAQSIMI TWO PACK) 3 MG/DOSE POWD Spray one device in one nostril for severe hypoglycemia and unconsciousness. Call 911 if used. Please open and label separately 1 school 1 home    blood glucose test strips (CONTOUR NEXT TEST) strip Test blood sugar up to 6x daily.    Lancets MISC Check blood sugar up to 6x daily    insulin glargine (BASAGLAR KWIKPEN) 100 UNIT/ML injection pen Inject 35 units daily for pump failure (Patient not taking: Reported on 1/23/2024)     No current facility-administered medications for this visit.     No Known Allergies    Review of Systems: PER HPI    Physical Examination:  Blood pressure 137/78, pulse

## 2024-11-20 NOTE — TELEPHONE ENCOUNTER
It appears he read my message but never replied:    Does your insurance cover novolog (or the generic aspart)? If so, where do you want this sent? Let me know when you have a chance.

## 2024-11-20 NOTE — TELEPHONE ENCOUNTER
LVM informing pt Dr. Nice has sent a [employer] message that you have not yet responded to. Please read this as soon as possible and if you are unable to get into [employer] to read the message then please call me back and I'm happy to read the message to you.

## 2024-11-21 RX ORDER — INSULIN ASPART 100 [IU]/ML
INJECTION, SOLUTION INTRAVENOUS; SUBCUTANEOUS
Qty: 30 ML | Refills: 11 | Status: SHIPPED | OUTPATIENT
Start: 2024-11-21

## 2025-01-07 ENCOUNTER — HOSPITAL ENCOUNTER (EMERGENCY)
Facility: HOSPITAL | Age: 25
Discharge: HOME | End: 2025-01-07
Attending: EMERGENCY MEDICINE
Payer: COMMERCIAL

## 2025-01-07 ENCOUNTER — APPOINTMENT (OUTPATIENT)
Facility: HOSPITAL | Age: 25
End: 2025-01-07
Payer: COMMERCIAL

## 2025-01-07 VITALS
BODY MASS INDEX: 22.15 KG/M2 | DIASTOLIC BLOOD PRESSURE: 82 MMHG | OXYGEN SATURATION: 98 % | HEART RATE: 89 BPM | TEMPERATURE: 98.2 F | WEIGHT: 146.16 LBS | SYSTOLIC BLOOD PRESSURE: 129 MMHG | HEIGHT: 68 IN | RESPIRATION RATE: 15 BRPM

## 2025-01-07 DIAGNOSIS — R11.2 NAUSEA AND VOMITING, UNSPECIFIED VOMITING TYPE: ICD-10-CM

## 2025-01-07 DIAGNOSIS — E10.9 TYPE 1 DIABETES MELLITUS WITHOUT COMPLICATION (HCC): ICD-10-CM

## 2025-01-07 DIAGNOSIS — J10.1 INFLUENZA A: Primary | ICD-10-CM

## 2025-01-07 LAB
ALBUMIN SERPL-MCNC: 3.9 G/DL (ref 3.5–5)
ALBUMIN/GLOB SERPL: 1 (ref 1.1–2.2)
ALP SERPL-CCNC: 71 U/L (ref 45–117)
ALT SERPL-CCNC: 22 U/L (ref 12–78)
ANION GAP SERPL CALC-SCNC: 4 MMOL/L (ref 2–12)
APPEARANCE UR: CLEAR
AST SERPL-CCNC: 27 U/L (ref 15–37)
BACTERIA URNS QL MICRO: NEGATIVE /HPF
BASOPHILS # BLD: 0.02 K/UL (ref 0–0.1)
BASOPHILS NFR BLD: 0.3 % (ref 0–1)
BILIRUB SERPL-MCNC: 0.4 MG/DL (ref 0.2–1)
BILIRUB UR QL: NEGATIVE
BUN SERPL-MCNC: 12 MG/DL (ref 6–20)
BUN/CREAT SERPL: 11 (ref 12–20)
CALCIUM SERPL-MCNC: 9.3 MG/DL (ref 8.5–10.1)
CHLORIDE SERPL-SCNC: 105 MMOL/L (ref 97–108)
CO2 SERPL-SCNC: 27 MMOL/L (ref 21–32)
COLOR UR: ABNORMAL
CREAT SERPL-MCNC: 1.06 MG/DL (ref 0.7–1.3)
DIFFERENTIAL METHOD BLD: ABNORMAL
EOSINOPHIL # BLD: 0.01 K/UL (ref 0–0.4)
EOSINOPHIL NFR BLD: 0.1 % (ref 0–7)
EPITH CASTS URNS QL MICRO: ABNORMAL /LPF
ERYTHROCYTE [DISTWIDTH] IN BLOOD BY AUTOMATED COUNT: 12.9 % (ref 11.5–14.5)
FLUAV RNA SPEC QL NAA+PROBE: DETECTED
FLUBV RNA SPEC QL NAA+PROBE: NOT DETECTED
GLOBULIN SER CALC-MCNC: 3.9 G/DL (ref 2–4)
GLUCOSE SERPL-MCNC: 155 MG/DL (ref 65–100)
GLUCOSE UR STRIP.AUTO-MCNC: NEGATIVE MG/DL
HCT VFR BLD AUTO: 43.3 % (ref 36.6–50.3)
HGB BLD-MCNC: 15 G/DL (ref 12.1–17)
HGB UR QL STRIP: NEGATIVE
HYALINE CASTS URNS QL MICRO: ABNORMAL /LPF (ref 0–5)
IMM GRANULOCYTES # BLD AUTO: 0.02 K/UL (ref 0–0.04)
IMM GRANULOCYTES NFR BLD AUTO: 0.3 % (ref 0–0.5)
KETONES UR QL STRIP.AUTO: 15 MG/DL
LEUKOCYTE ESTERASE UR QL STRIP.AUTO: NEGATIVE
LYMPHOCYTES # BLD: 0.82 K/UL (ref 0.8–3.5)
LYMPHOCYTES NFR BLD: 10.4 % (ref 12–49)
MCH RBC QN AUTO: 29.9 PG (ref 26–34)
MCHC RBC AUTO-ENTMCNC: 34.6 G/DL (ref 30–36.5)
MCV RBC AUTO: 86.3 FL (ref 80–99)
MONOCYTES # BLD: 0.82 K/UL (ref 0–1)
MONOCYTES NFR BLD: 10.4 % (ref 5–13)
NEUTS SEG # BLD: 6.21 K/UL (ref 1.8–8)
NEUTS SEG NFR BLD: 78.5 % (ref 32–75)
NITRITE UR QL STRIP.AUTO: NEGATIVE
NRBC # BLD: 0 K/UL (ref 0–0.01)
NRBC BLD-RTO: 0 PER 100 WBC
PH UR STRIP: 6 (ref 5–8)
PLATELET # BLD AUTO: 298 K/UL (ref 150–400)
PMV BLD AUTO: 10 FL (ref 8.9–12.9)
POTASSIUM SERPL-SCNC: 4.4 MMOL/L (ref 3.5–5.1)
PROT SERPL-MCNC: 7.8 G/DL (ref 6.4–8.2)
PROT UR STRIP-MCNC: ABNORMAL MG/DL
RBC # BLD AUTO: 5.02 M/UL (ref 4.1–5.7)
RBC #/AREA URNS HPF: ABNORMAL /HPF (ref 0–5)
S PYO DNA THROAT QL NAA+PROBE: NOT DETECTED
SARS-COV-2 RNA RESP QL NAA+PROBE: NOT DETECTED
SODIUM SERPL-SCNC: 136 MMOL/L (ref 136–145)
SOURCE: ABNORMAL
SP GR UR REFRACTOMETRY: 1.02 (ref 1–1.03)
SPECIMEN HOLD: NORMAL
UROBILINOGEN UR QL STRIP.AUTO: 0.2 EU/DL (ref 0.2–1)
WBC # BLD AUTO: 7.9 K/UL (ref 4.1–11.1)
WBC URNS QL MICRO: ABNORMAL /HPF (ref 0–4)

## 2025-01-07 PROCEDURE — 99284 EMERGENCY DEPT VISIT MOD MDM: CPT

## 2025-01-07 PROCEDURE — 2580000003 HC RX 258: Performed by: PHYSICIAN ASSISTANT

## 2025-01-07 PROCEDURE — 96361 HYDRATE IV INFUSION ADD-ON: CPT

## 2025-01-07 PROCEDURE — 87651 STREP A DNA AMP PROBE: CPT

## 2025-01-07 PROCEDURE — 81001 URINALYSIS AUTO W/SCOPE: CPT

## 2025-01-07 PROCEDURE — 96374 THER/PROPH/DIAG INJ IV PUSH: CPT

## 2025-01-07 PROCEDURE — 85025 COMPLETE CBC W/AUTO DIFF WBC: CPT

## 2025-01-07 PROCEDURE — 80053 COMPREHEN METABOLIC PANEL: CPT

## 2025-01-07 PROCEDURE — 6360000002 HC RX W HCPCS: Performed by: PHYSICIAN ASSISTANT

## 2025-01-07 PROCEDURE — 71046 X-RAY EXAM CHEST 2 VIEWS: CPT

## 2025-01-07 PROCEDURE — 87636 SARSCOV2 & INF A&B AMP PRB: CPT

## 2025-01-07 PROCEDURE — 36415 COLL VENOUS BLD VENIPUNCTURE: CPT

## 2025-01-07 PROCEDURE — 6370000000 HC RX 637 (ALT 250 FOR IP): Performed by: PHYSICIAN ASSISTANT

## 2025-01-07 RX ORDER — 0.9 % SODIUM CHLORIDE 0.9 %
1000 INTRAVENOUS SOLUTION INTRAVENOUS ONCE
Status: COMPLETED | OUTPATIENT
Start: 2025-01-07 | End: 2025-01-07

## 2025-01-07 RX ORDER — ONDANSETRON 4 MG/1
4 TABLET, ORALLY DISINTEGRATING ORAL 3 TIMES DAILY PRN
Qty: 21 TABLET | Refills: 0 | Status: SHIPPED | OUTPATIENT
Start: 2025-01-07

## 2025-01-07 RX ORDER — IBUPROFEN 400 MG/1
800 TABLET, FILM COATED ORAL ONCE
Status: COMPLETED | OUTPATIENT
Start: 2025-01-07 | End: 2025-01-07

## 2025-01-07 RX ORDER — ONDANSETRON 2 MG/ML
4 INJECTION INTRAMUSCULAR; INTRAVENOUS ONCE
Status: COMPLETED | OUTPATIENT
Start: 2025-01-07 | End: 2025-01-07

## 2025-01-07 RX ORDER — ONDANSETRON 4 MG/1
4 TABLET, ORALLY DISINTEGRATING ORAL 3 TIMES DAILY PRN
Qty: 21 TABLET | Refills: 0 | Status: SHIPPED | OUTPATIENT
Start: 2025-01-07 | End: 2025-01-07

## 2025-01-07 RX ORDER — ACETAMINOPHEN 500 MG
1000 TABLET ORAL ONCE
Status: DISCONTINUED | OUTPATIENT
Start: 2025-01-07 | End: 2025-01-07 | Stop reason: HOSPADM

## 2025-01-07 RX ADMIN — SODIUM CHLORIDE 1000 ML: 9 INJECTION, SOLUTION INTRAVENOUS at 09:52

## 2025-01-07 RX ADMIN — ONDANSETRON 4 MG: 2 INJECTION INTRAMUSCULAR; INTRAVENOUS at 09:52

## 2025-01-07 RX ADMIN — SODIUM CHLORIDE 1000 ML: 9 INJECTION, SOLUTION INTRAVENOUS at 12:01

## 2025-01-07 RX ADMIN — IBUPROFEN 800 MG: 400 TABLET, FILM COATED ORAL at 10:10

## 2025-01-07 ASSESSMENT — PAIN DESCRIPTION - PAIN TYPE: TYPE: ACUTE PAIN

## 2025-01-07 ASSESSMENT — PAIN DESCRIPTION - ONSET: ONSET: ON-GOING

## 2025-01-07 ASSESSMENT — PAIN SCALES - GENERAL: PAINLEVEL_OUTOF10: 7

## 2025-01-07 ASSESSMENT — PAIN DESCRIPTION - DESCRIPTORS: DESCRIPTORS: ACHING

## 2025-01-07 ASSESSMENT — PAIN - FUNCTIONAL ASSESSMENT
PAIN_FUNCTIONAL_ASSESSMENT: 0-10
PAIN_FUNCTIONAL_ASSESSMENT: PREVENTS OR INTERFERES SOME ACTIVE ACTIVITIES AND ADLS

## 2025-01-07 ASSESSMENT — PAIN DESCRIPTION - ORIENTATION: ORIENTATION: MID

## 2025-01-07 ASSESSMENT — PAIN DESCRIPTION - FREQUENCY: FREQUENCY: CONTINUOUS

## 2025-01-07 ASSESSMENT — PAIN DESCRIPTION - LOCATION: LOCATION: THROAT

## 2025-01-07 NOTE — ED TRIAGE NOTES
Diagnosed with URI yesterday at [facility]. C/o sore throat, fever, HA, dry heaving. Hx Type 1 DM. States +ketones in urine.

## 2025-01-07 NOTE — DISCHARGE INSTRUCTIONS
Fluids, rest, symptomatic and supportive care  Return to ED as needed or if unable to hold fluids, other symptoms occur  Nausea and vomiting likely due to influenza, now improved  Take ondansetron as needed for nausea, if you are unable to hold fluids return to the ED  
no vomiting/no diarrhea/no nausea
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
## 2025-02-05 DIAGNOSIS — E10.9 TYPE 1 DIABETES MELLITUS WITHOUT COMPLICATION (HCC): ICD-10-CM

## 2025-02-05 DIAGNOSIS — E83.52 HYPERCALCEMIA: ICD-10-CM

## 2025-02-26 ENCOUNTER — OFFICE VISIT (OUTPATIENT)
Age: 25
End: 2025-02-26
Payer: COMMERCIAL

## 2025-02-26 VITALS
HEIGHT: 68 IN | BODY MASS INDEX: 21.89 KG/M2 | DIASTOLIC BLOOD PRESSURE: 69 MMHG | WEIGHT: 144.4 LBS | SYSTOLIC BLOOD PRESSURE: 132 MMHG | HEART RATE: 79 BPM

## 2025-02-26 DIAGNOSIS — E10.9 TYPE 1 DIABETES MELLITUS WITHOUT COMPLICATION (HCC): Primary | ICD-10-CM

## 2025-02-26 DIAGNOSIS — E83.52 HYPERCALCEMIA: ICD-10-CM

## 2025-02-26 LAB — HBA1C MFR BLD: 6.7 %

## 2025-02-26 PROCEDURE — 83036 HEMOGLOBIN GLYCOSYLATED A1C: CPT | Performed by: INTERNAL MEDICINE

## 2025-02-26 PROCEDURE — 3044F HG A1C LEVEL LT 7.0%: CPT | Performed by: INTERNAL MEDICINE

## 2025-02-26 PROCEDURE — 99214 OFFICE O/P EST MOD 30 MIN: CPT | Performed by: INTERNAL MEDICINE

## 2025-02-26 NOTE — PROGRESS NOTES
Chief Complaint   Patient presents with    Diabetes     PCP and pharmacy confirmed  Pt consented to use of [facility]     History of Present Illness: Bjorn Faith is a 25 y.o. male here for follow up of diabetes.  Weight up 3 lbs since last visit in 8/24.    Current settings are as follows:  - basal: 12a: 0.98, 8a: 1.25, 10p: 0.9  - Carb ratio: 12a: 8, 8a: 9. 10p: 8  - sensitivity: 12a: 50, 8a: 45, 10p: 50  - target: 110  - active insulin time: 5 hours.      History of Present Illness  The patient is a 25-year-old male here for a follow-up of diabetes.    He has been managing his diabetes with NovoLog in the pump, without any recent changes to his pump settings. He reports no significant hypoglycemic episodes at work, except for a gradual decrease in blood glucose levels, which he is able to manage. His morning blood glucose readings typically range from 80 to 100 when he adheres to the recommended site change schedule. However, he occasionally extends the use of a site beyond 3 days, but never exceeds 4 days. On the third day, he may observe elevated blood glucose levels, reaching up to 200s. He does not allow his blood glucose levels to remain high unless he is unable to reduce them. Postprandial readings are generally below 180, although they may occasionally spike. He has been making efforts to improve his pre-bolus administration. He expresses satisfaction with his current settings and reports no new symptoms of numbness or tingling in his feet. He also reports no leg swelling.    He has not yet consulted with Dr. Almonte due to transportation issues and requests a reprint of the contact information and I sent this to him over [employer].    He has not recently used Tums and has not experienced any new kidney stones in the past 6 months. He inquires if his calcium was not artificially high on that reading, would he be having kidney stones.    Supplemental Information  He had influenza infection in

## 2025-02-26 NOTE — PATIENT INSTRUCTIONS
1) Your Hemoglobin A1c (3 month test of blood sugar) was 6.7% which is the best in years.  Keep up the good work!    2) Your blood pressure looks great.    3) Keep avoiding TUMS and we'll repeat your calcium with your next set of labs.

## 2025-03-12 ENCOUNTER — TELEPHONE (OUTPATIENT)
Age: 25
End: 2025-03-12

## 2025-03-12 NOTE — TELEPHONE ENCOUNTER
LVM informing pt Dr. Nice has sent you a [employer] message that you have not yet read. Please read this as soon as possible and if you are unable to get into [employer] to read the message then please call me back and I'm happy to read the message to you.

## 2025-03-12 NOTE — TELEPHONE ENCOUNTER
Please call pt to let him know he has an unread message in [facility]):  If you reach him, please just read the message to him but if you don't then it's fine to leave a message asking them to check [facility] to read the message at their earliest convenience.    From  Godwin Nice MD To  Bjorn Faith Sent and Delivered  2/26/2025 12:15 PM       I forgot to put this on your after visit summary for podiatry:  Dr. Tu Almonte [phone].

## 2025-03-27 ENCOUNTER — TELEPHONE (OUTPATIENT)
Age: 25
End: 2025-03-27

## 2025-03-28 NOTE — TELEPHONE ENCOUNTER
FMLA form faxed, confirmation received. Pt notified via [employer] and asked that email address be confirmed.

## 2025-03-28 NOTE — TELEPHONE ENCOUNTER
Please fax his FMLA form to [employer] disability, leaves and accommodation service [employer] at [phone].       Also please e-mail patient a copy per his request and let him know over mychart when this has been done.  Thanks.

## 2025-03-31 NOTE — TELEPHONE ENCOUNTER
Emailed to email address on file [email or web address]. Asked pt to contact office once received.

## 2025-05-31 DIAGNOSIS — E10.9 TYPE 1 DIABETES MELLITUS WITHOUT COMPLICATION (HCC): Primary | ICD-10-CM

## 2025-07-30 ENCOUNTER — PATIENT MESSAGE (OUTPATIENT)
Age: 25
End: 2025-07-30

## 2025-07-30 RX ORDER — GLUCAGON 3 MG/1
POWDER NASAL
Qty: 1 EACH | Refills: 0 | Status: SHIPPED | OUTPATIENT
Start: 2025-07-30

## 2025-08-25 DIAGNOSIS — E10.9 TYPE 1 DIABETES MELLITUS WITHOUT COMPLICATION (HCC): ICD-10-CM

## 2025-08-25 DIAGNOSIS — E83.52 HYPERCALCEMIA: ICD-10-CM
